# Patient Record
Sex: MALE | Race: WHITE | NOT HISPANIC OR LATINO | Employment: FULL TIME | URBAN - METROPOLITAN AREA
[De-identification: names, ages, dates, MRNs, and addresses within clinical notes are randomized per-mention and may not be internally consistent; named-entity substitution may affect disease eponyms.]

---

## 2017-01-04 ENCOUNTER — OFFICE VISIT (OUTPATIENT)
Dept: FAMILY MEDICINE CLINIC | Facility: CLINIC | Age: 47
End: 2017-01-04

## 2017-01-04 VITALS
OXYGEN SATURATION: 98 % | HEART RATE: 60 BPM | RESPIRATION RATE: 16 BRPM | WEIGHT: 179 LBS | TEMPERATURE: 97.9 F | SYSTOLIC BLOOD PRESSURE: 110 MMHG | HEIGHT: 71 IN | DIASTOLIC BLOOD PRESSURE: 72 MMHG | BODY MASS INDEX: 25.06 KG/M2

## 2017-01-04 DIAGNOSIS — M25.572 ACUTE LEFT ANKLE PAIN: ICD-10-CM

## 2017-01-04 DIAGNOSIS — R79.89 ELEVATED SERUM CREATININE: Primary | ICD-10-CM

## 2017-01-04 LAB
BILIRUB BLD-MCNC: NEGATIVE MG/DL
BUN SERPL-MCNC: 16 MG/DL (ref 6–20)
BUN/CREAT SERPL: 12.7 (ref 7–25)
CALCIUM SERPL-MCNC: 9.6 MG/DL (ref 8.6–10.5)
CHLORIDE SERPL-SCNC: 103 MMOL/L (ref 98–107)
CLARITY, POC: CLEAR
CO2 SERPL-SCNC: 27.5 MMOL/L (ref 22–29)
COLOR UR: YELLOW
CREAT SERPL-MCNC: 1.26 MG/DL (ref 0.76–1.27)
GLUCOSE SERPL-MCNC: 78 MG/DL (ref 65–99)
GLUCOSE UR STRIP-MCNC: NEGATIVE MG/DL
KETONES UR QL: NEGATIVE
LEUKOCYTE EST, POC: NEGATIVE
NITRITE UR-MCNC: NEGATIVE MG/ML
PH UR: 7 [PH] (ref 5–8)
POTASSIUM SERPL-SCNC: 3.9 MMOL/L (ref 3.5–5.2)
PROT UR STRIP-MCNC: NEGATIVE MG/DL
RBC # UR STRIP: NEGATIVE /UL
SODIUM SERPL-SCNC: 141 MMOL/L (ref 136–145)
SP GR UR: 1.02 (ref 1–1.03)
UROBILINOGEN UR QL: NORMAL

## 2017-01-04 PROCEDURE — 81003 URINALYSIS AUTO W/O SCOPE: CPT | Performed by: FAMILY MEDICINE

## 2017-01-04 PROCEDURE — 99214 OFFICE O/P EST MOD 30 MIN: CPT | Performed by: FAMILY MEDICINE

## 2017-01-04 NOTE — PROGRESS NOTES
Subjective   Landon Howell is a 46 y.o. male.     Chief Complaint   Patient presents with   • Follow-up     Pt here for f/u labwork       HPI     Pt here to follow up on an elevated creatinine level on routine blood work done at the beginning of December. No family hx of kidney disease. No blood in his urine. No hx of renal stones. He states that he thinks he drinks an adequate amount of water. Denies flank pain or abdominal pain. BP well controlled. No lower extremity swelling.     Patient also reports some pain in his left ankle for little over a week.  He states that it only happens when he is running on the treadmill.  Pain is sharp and located in the ankle joint.  He denies any swelling or bruising.  No numbness or tingling in the toes or foot.  No history of injury.  He states that the pain resolves once he stops running.  It happens and no other areas.  He does not have pain when he does the elliptical.  He does not have any pain with walking.    The following portions of the patient's history were reviewed and updated as appropriate: allergies, current medications, past family history, past medical history, past social history, past surgical history and problem list.    Review of Systems   All other systems reviewed and are negative.      Objective  Vitals:    01/04/17 0803   BP: 110/72   Pulse: 60   Resp: 16   Temp: 97.9 °F (36.6 °C)   SpO2: 98%        Physical Exam   Constitutional: He is oriented to person, place, and time. He appears well-developed and well-nourished. No distress.   HENT:   Head: Normocephalic and atraumatic.   Right Ear: External ear normal.   Left Ear: External ear normal.   Nose: Nose normal.   Mouth/Throat: Oropharynx is clear and moist.   Eyes: Conjunctivae and EOM are normal. Pupils are equal, round, and reactive to light. Right eye exhibits no discharge. Left eye exhibits no discharge. No scleral icterus.   Cardiovascular: Normal rate, regular rhythm and normal heart sounds.     Pulmonary/Chest: Effort normal and breath sounds normal. No respiratory distress. He has no wheezes. He has no rales.   Abdominal: Soft. Bowel sounds are normal. He exhibits no distension. There is no tenderness.   Musculoskeletal:        Left ankle: He exhibits normal range of motion, no swelling, no ecchymosis, no deformity, no laceration and normal pulse. No tenderness. No lateral malleolus, no medial malleolus, no AITFL, no CF ligament, no posterior TFL, no head of 5th metatarsal and no proximal fibula tenderness found. Achilles tendon exhibits no pain.   Neurological: He is alert and oriented to person, place, and time.   Skin: Skin is warm and dry. He is not diaphoretic.   Nursing note and vitals reviewed.        Current Outpatient Prescriptions:   •  fenofibrate 160 MG tablet, Take 1 tablet by mouth daily., Disp: 90 tablet, Rfl: 1  •  omeprazole (PriLOSEC) 40 MG capsule, Take 1 capsule by mouth Daily., Disp: 90 capsule, Rfl: 1  •  ranitidine (ZANTAC) 150 MG capsule, , Disp: , Rfl:     Procedures    Lab Results (most recent)     None          Assessment/Plan   Landon was seen today for follow-up.    Diagnoses and all orders for this visit:    Elevated serum creatinine  -     Basic Metabolic Panel  -     POC Urinalysis Dipstick, Automated    Acute left ankle pain      We will get a basic metabolic panel to reevaluate kidney function.  UA done in the office was negative. If levels continue to be elevated will consider imaging and possible referral to nephrology.    Physical exam reassuring for left ankle pain.  No radiographs necessary at this time  Advised anti-inflammatory medication and rest.  If continues to have issues or problems gets worse will consider physical therapy.     Return in about 3 months (around 4/4/2017) for Recheck.      Kip Torres MD

## 2017-01-04 NOTE — MR AVS SNAPSHOT
Landon Howell   1/4/2017 8:00 AM   Office Visit    Dept Phone:  [phone]   Encounter #:  [id number]    Provider:  Kip Torres MD   Department:  [facility]                Your Full Care Plan              Today's Medication Changes          These changes are accurate as of: 1/4/17  8:35 AM.  If you have any questions, ask your nurse or doctor.               Stop taking medication(s)listed here:     Cetirizine HCl 10 MG capsule   Stopped by:  Kip Torres MD                      Your Updated Medication List          This list is accurate as of: 1/4/17  8:35 AM.  Always use your most recent med list.                fenofibrate 160 MG tablet   Take 1 tablet by mouth daily.       omeprazole 40 MG capsule   Commonly known as:  priLOSEC   Take 1 capsule by mouth Daily.       ranitidine 150 MG capsule   Commonly known as:  ZANTAC               We Performed the Following     Basic Metabolic Panel     POC Urinalysis Dipstick, Automated       You Were Diagnosed With        Codes Comments    Elevated serum creatinine    -  Primary ICD-10-CM: R79.89  ICD-9-CM: 790.99     Acute left ankle pain     ICD-10-CM: M25.572  ICD-9-CM: 719.47       Instructions     None    Patient Instructions History      Upcoming Appointments     Visit Type Date Time Department    OFFICE VISIT 1/4/2017  8:00 AM DONALD BARRIENTOS      [employer] Signup     Our records indicate that you have an active [employer] account.    You can view your After Visit Summary by going to [employer] and logging in with your [employer] username and password.  If you don't have a [employer] username and password but a parent or guardian has access to your record, the parent or guardian should login with their own [employer] username and password and access your record to view the After Visit Summary.    If you have questions, you can email [email or web address] or call  "[phone] to talk to our MyChart staff.  Remember, MyChart is NOT to be used for urgent needs.  For medical emergencies, dial 911.               Other Info from Your Visit           Allergies     No Known Allergies      Reason for Visit     Follow-up Pt here for f/u labwork      Vital Signs     Blood Pressure Pulse Temperature Respirations Height Weight    110/72 60 97.9 °F (36.6 °C) (Oral) 16 71\" (180.3 cm) 179 lb (81.2 kg)    Oxygen Saturation Body Mass Index Smoking Status             98% 24.97 kg/m2 Never Smoker         Problems and Diagnoses Noted     Serum creatinine raised    -  Primary    Acute left ankle pain            "

## 2017-01-05 ENCOUNTER — TELEPHONE (OUTPATIENT)
Dept: FAMILY MEDICINE CLINIC | Facility: CLINIC | Age: 47
End: 2017-01-05

## 2017-01-05 RX ORDER — ZOLPIDEM TARTRATE 10 MG/1
10 TABLET ORAL NIGHTLY PRN
Qty: 10 TABLET | Refills: 0 | OUTPATIENT
Start: 2017-01-05 | End: 2019-06-26

## 2017-01-05 NOTE — TELEPHONE ENCOUNTER
Pt called requesting Rx for Ambien. Stated he will be traveling and uses it for jet lag. Only wants 10 pills.

## 2017-02-28 DIAGNOSIS — E78.5 HYPERLIPIDEMIA, UNSPECIFIED HYPERLIPIDEMIA TYPE: Primary | ICD-10-CM

## 2017-02-28 RX ORDER — FENOFIBRATE 160 MG/1
160 TABLET ORAL DAILY
Qty: 90 TABLET | Refills: 1 | Status: SHIPPED | OUTPATIENT
Start: 2017-02-28 | End: 2017-09-28 | Stop reason: SDUPTHER

## 2017-03-22 RX ORDER — OMEPRAZOLE 40 MG/1
40 CAPSULE, DELAYED RELEASE ORAL DAILY
Qty: 90 CAPSULE | Refills: 1 | Status: SHIPPED | OUTPATIENT
Start: 2017-03-22 | End: 2017-11-29 | Stop reason: SDUPTHER

## 2017-07-31 ENCOUNTER — OFFICE VISIT (OUTPATIENT)
Dept: FAMILY MEDICINE CLINIC | Facility: CLINIC | Age: 47
End: 2017-07-31

## 2017-07-31 VITALS
RESPIRATION RATE: 16 BRPM | DIASTOLIC BLOOD PRESSURE: 68 MMHG | HEART RATE: 61 BPM | TEMPERATURE: 98.6 F | HEIGHT: 71 IN | BODY MASS INDEX: 25.06 KG/M2 | WEIGHT: 179 LBS | OXYGEN SATURATION: 98 % | SYSTOLIC BLOOD PRESSURE: 128 MMHG

## 2017-07-31 DIAGNOSIS — K21.9 GASTROESOPHAGEAL REFLUX DISEASE WITHOUT ESOPHAGITIS: ICD-10-CM

## 2017-07-31 DIAGNOSIS — L20.89 OTHER ATOPIC DERMATITIS: Primary | ICD-10-CM

## 2017-07-31 DIAGNOSIS — M54.6 ACUTE RIGHT-SIDED THORACIC BACK PAIN: ICD-10-CM

## 2017-07-31 PROBLEM — L20.9 ATOPIC DERMATITIS: Status: ACTIVE | Noted: 2017-07-31

## 2017-07-31 PROCEDURE — 99214 OFFICE O/P EST MOD 30 MIN: CPT | Performed by: FAMILY MEDICINE

## 2017-07-31 NOTE — PROGRESS NOTES
Subjective   Landon Howell is a 47 y.o. male.     Chief Complaint   Patient presents with   • Back Pain     Patient has lower back pain comesnd goes for 2 weeks.    • Earache     Patient has a itchness in both ears. Heused to have Hydocort it did help.    • Heartburn     Patient is not using Prilosecc he is nottaking it anymore but he still has heartburn feelings.        HPI     Patient is here to discuss a couple of issues that are new to me and an issue that is old to me.  Patient has a history of pretty extensive gastroesophageal reflux disease.  Previously he was using omeprazole 40 mg twice a day.  Since our last visit he has change that to omeprazole 40 mg in the morning and Zantac 150 mg at night.  Since then he was doing some research about PPI therapy and became concerned about the risks associated with that medication.  He has since stopped taking medication and is only using Zantac accompanied with Tums throughout the day.  Symptom control has not been great.  He continues to get some reflux about 2-3 times a day.  No nausea or vomiting or blood in his stool.    Patient has history of atopic dermatitis in his bilateral ears.  He's used some steroid with antibiotic drops in the past with good results.    Patient is acute onset of some right-sided thoracic back pain.  He is unsure if it was due to any increase in activity.  He denies any injury.  Pain has responded well to NSAIDs.  Penis can of a dull ache but can become sharp with movement.    The following portions of the patient's history were reviewed and updated as appropriate: allergies, current medications, past family history, past medical history, past social history, past surgical history and problem list.    Review of Systems   HENT: Positive for ear pain.    Musculoskeletal: Positive for back pain.   All other systems reviewed and are negative.      Objective  Vitals:    07/31/17 1510   BP: 128/68   Pulse: 61   Resp: 16   Temp: 98.6 °F (37  °C)   SpO2: 98%        Physical Exam   Constitutional: He is oriented to person, place, and time. He appears well-developed and well-nourished. No distress.   HENT:   Head: Normocephalic and atraumatic.   Right Ear: External ear normal.   Left Ear: External ear normal.   Nose: Nose normal.   Mouth/Throat: Oropharynx is clear and moist.   Eyes: Conjunctivae and EOM are normal. Pupils are equal, round, and reactive to light. Right eye exhibits no discharge. Left eye exhibits no discharge. No scleral icterus.   Cardiovascular: Normal rate, regular rhythm and normal heart sounds.    Pulmonary/Chest: Effort normal and breath sounds normal. No respiratory distress. He has no wheezes. He has no rales.   Abdominal: Soft. Bowel sounds are normal. He exhibits no distension. There is no tenderness.   Musculoskeletal:        Thoracic back: He exhibits decreased range of motion, tenderness and pain. He exhibits no bony tenderness, no swelling, no edema, no deformity, no laceration, no spasm and normal pulse.        Back:    Neurological: He is alert and oriented to person, place, and time.   Skin: Skin is warm and dry. He is not diaphoretic.   Nursing note and vitals reviewed.        Current Outpatient Prescriptions:   •  ranitidine (ZANTAC) 150 MG capsule, , Disp: , Rfl:   •  fenofibrate 160 MG tablet, Take 1 tablet by mouth Daily., Disp: 90 tablet, Rfl: 1  •  omeprazole (priLOSEC) 40 MG capsule, Take 1 capsule by mouth Daily., Disp: 90 capsule, Rfl: 1  •  zolpidem (AMBIEN) 10 MG tablet, Take 1 tablet by mouth At Night As Needed for sleep., Disp: 10 tablet, Rfl: 0    Procedures    Lab Results (most recent)     None          Assessment/Plan   Landon was seen today for back pain, earache and heartburn.    Diagnoses and all orders for this visit:    Other atopic dermatitis    Gastroesophageal reflux disease without esophagitis    Acute right-sided thoracic back pain      After extensive conversation regarding the history of  reflux and his current treatment plan advised him that it would probably be okay to continue the omeprazole in the morning and combine that with the Zantac in the evening.  This gave him good symptom control.  We discussed the potential adverse side effects of using omeprazole.  Patient will consider this and let us know what he decides to do.    Some samples of you Eucrisa were given to the patient to trial.  If he likes this treatment option a prescription will be placed.    .  Conservative management for the patient's thoracic back pain.  Advised him on continued rest and use of NSAIDs.    Return for As needed.      Kip Torres MD

## 2017-09-28 DIAGNOSIS — E78.5 HYPERLIPIDEMIA, UNSPECIFIED HYPERLIPIDEMIA TYPE: ICD-10-CM

## 2017-09-28 RX ORDER — FENOFIBRATE 160 MG/1
160 TABLET ORAL DAILY
Qty: 90 TABLET | Refills: 3 | Status: SHIPPED | OUTPATIENT
Start: 2017-09-28 | End: 2018-10-16 | Stop reason: SDUPTHER

## 2017-11-06 ENCOUNTER — OFFICE VISIT (OUTPATIENT)
Dept: FAMILY MEDICINE CLINIC | Facility: CLINIC | Age: 47
End: 2017-11-06

## 2017-11-06 VITALS
SYSTOLIC BLOOD PRESSURE: 122 MMHG | OXYGEN SATURATION: 99 % | RESPIRATION RATE: 14 BRPM | DIASTOLIC BLOOD PRESSURE: 70 MMHG | HEART RATE: 66 BPM | TEMPERATURE: 98.3 F | HEIGHT: 71 IN | WEIGHT: 183 LBS | BODY MASS INDEX: 25.62 KG/M2

## 2017-11-06 DIAGNOSIS — M25.552 PAIN OF LEFT HIP JOINT: Primary | ICD-10-CM

## 2017-11-06 PROCEDURE — 99214 OFFICE O/P EST MOD 30 MIN: CPT | Performed by: FAMILY MEDICINE

## 2017-11-06 PROCEDURE — 73502 X-RAY EXAM HIP UNI 2-3 VIEWS: CPT | Performed by: FAMILY MEDICINE

## 2017-11-06 RX ORDER — CETIRIZINE HYDROCHLORIDE 10 MG/1
10 TABLET ORAL DAILY
COMMUNITY

## 2017-11-06 RX ORDER — MELOXICAM 15 MG/1
15 TABLET ORAL DAILY
Qty: 30 TABLET | Refills: 1 | Status: SHIPPED | OUTPATIENT
Start: 2017-11-06 | End: 2017-12-06

## 2017-11-06 NOTE — PROGRESS NOTES
Subjective   Landon Howell is a 47 y.o. male.     Chief Complaint   Patient presents with   • Hip Pain     Patient has left hip pain. He injured it 25 years ago.        HPI     Patient presented with left hip pain.  Patient states that he has been experiencing some intermittent dull left hip pain.  Does not radiate.  Patient states that the pain is located on the lateral aspect of the hip.  Is unaware of any exacerbating factors.  No numbness or tingling.  No known injury.  Patient states that 25 years ago he was on a hike and had some similar left hip pain that is kind of plagued him on and off over the years.  Does try some Aleve which takes the edge off.    The following portions of the patient's history were reviewed and updated as appropriate: allergies, current medications, past family history, past medical history, past social history, past surgical history and problem list.    Review of Systems   Musculoskeletal:        Left hip pain   All other systems reviewed and are negative.      Objective  Vitals:    11/06/17 0820   BP: 122/70   Pulse: 66   Resp: 14   Temp: 98.3 °F (36.8 °C)   SpO2: 99%        Physical Exam   Constitutional: He is oriented to person, place, and time. He appears well-developed and well-nourished. No distress.   Musculoskeletal:        Left hip: He exhibits tenderness and bony tenderness. He exhibits normal range of motion, normal strength, no swelling, no crepitus, no deformity and no laceration.        Legs:  Neurological: He is alert and oriented to person, place, and time.   Skin: He is not diaphoretic.   Psychiatric: He has a normal mood and affect. His behavior is normal.   Nursing note and vitals reviewed.        Current Outpatient Prescriptions:   •  cetirizine (zyrTEC) 10 MG tablet, Take 10 mg by mouth Daily., Disp: , Rfl:   •  fenofibrate 160 MG tablet, Take 1 tablet by mouth Daily., Disp: 90 tablet, Rfl: 3  •  omeprazole (priLOSEC) 40 MG capsule, Take 1 capsule by mouth  Daily., Disp: 90 capsule, Rfl: 1  •  ranitidine (ZANTAC) 150 MG capsule, , Disp: , Rfl:   •  meloxicam (MOBIC) 15 MG tablet, Take 1 tablet by mouth Daily for 30 days. 1 tab PO QD for pain, Disp: 30 tablet, Rfl: 1  •  zolpidem (AMBIEN) 10 MG tablet, Take 1 tablet by mouth At Night As Needed for sleep., Disp: 10 tablet, Rfl: 0    Procedures    Lab Results (most recent)     None          Assessment/Plan   Landon was seen today for hip pain.    Diagnoses and all orders for this visit:    Pain of left hip joint  -     meloxicam (MOBIC) 15 MG tablet; Take 1 tablet by mouth Daily for 30 days. 1 tab PO QD for pain  -     XR Hip With or Without Pelvis 2 - 3 View Left    2 view x-ray done of the left hip in the office for pain.  No previous images were available for comparison.  No acute osseous abnormality noted.  Good joint spacing.  No osteophytes or arthritis noted.    Discuss treatment options.  We will go with conservative management at this time with meloxicam.  Advised patient to take 1 tablet daily for 2 weeks.  If no improvement will consider physical therapy.      Return in about 2 weeks (around 11/20/2017) for Recheck.      Kip Torres MD

## 2017-11-29 ENCOUNTER — TELEPHONE (OUTPATIENT)
Dept: FAMILY MEDICINE CLINIC | Facility: CLINIC | Age: 47
End: 2017-11-29

## 2017-11-29 RX ORDER — OMEPRAZOLE 40 MG/1
40 CAPSULE, DELAYED RELEASE ORAL DAILY
Qty: 90 CAPSULE | Refills: 3 | Status: SHIPPED | OUTPATIENT
Start: 2017-11-29 | End: 2019-06-26

## 2018-01-12 ENCOUNTER — OFFICE VISIT (OUTPATIENT)
Dept: FAMILY MEDICINE CLINIC | Facility: CLINIC | Age: 48
End: 2018-01-12

## 2018-01-12 VITALS
WEIGHT: 183.2 LBS | HEIGHT: 71 IN | RESPIRATION RATE: 14 BRPM | HEART RATE: 69 BPM | TEMPERATURE: 98.3 F | DIASTOLIC BLOOD PRESSURE: 60 MMHG | OXYGEN SATURATION: 99 % | SYSTOLIC BLOOD PRESSURE: 122 MMHG | BODY MASS INDEX: 25.65 KG/M2

## 2018-01-12 DIAGNOSIS — R10.13 EPIGASTRIC PAIN: Primary | ICD-10-CM

## 2018-01-12 PROCEDURE — 99213 OFFICE O/P EST LOW 20 MIN: CPT | Performed by: FAMILY MEDICINE

## 2018-01-12 RX ORDER — TRIAMCINOLONE ACETONIDE 1 MG/G
CREAM TOPICAL
Refills: 1 | COMMUNITY
Start: 2017-12-08 | End: 2019-06-26

## 2018-01-12 NOTE — PROGRESS NOTES
Subjective   Landon Howell is a 47 y.o. male.     Chief Complaint   Patient presents with   • Back Pain     Patient has a upper back pain for 3 weeks.        HPI     Patient presents to the office complaining of intermittent thoracic back pain that radiates to his epigastric region on the right side.  No fever or chills.  Does not believe any certain foods are the trigger.  Has history of reflux.  Pain is sharp and stabbing.    The following portions of the patient's history were reviewed and updated as appropriate: allergies, current medications, past family history, past medical history, past social history, past surgical history and problem list.    Review of Systems   Musculoskeletal: Positive for back pain.   All other systems reviewed and are negative.      Objective  Vitals:    01/12/18 0817   BP: 122/60   Pulse: 69   Resp: 14   Temp: 98.3 °F (36.8 °C)   SpO2: 99%        Physical Exam   Constitutional: He is oriented to person, place, and time. He appears well-developed and well-nourished. No distress.   HENT:   Head: Normocephalic and atraumatic.   Right Ear: External ear normal.   Left Ear: External ear normal.   Nose: Nose normal.   Mouth/Throat: Oropharynx is clear and moist.   Eyes: Conjunctivae and EOM are normal. Pupils are equal, round, and reactive to light. Right eye exhibits no discharge. Left eye exhibits no discharge. No scleral icterus.   Cardiovascular: Normal rate, regular rhythm and normal heart sounds.    Pulmonary/Chest: Effort normal and breath sounds normal. No respiratory distress. He has no wheezes. He has no rales.   Abdominal: Soft. Bowel sounds are normal. He exhibits no distension and no mass. There is tenderness (epigastric and right upper quadrant). There is no rebound and no guarding. No hernia.   Neurological: He is alert and oriented to person, place, and time.   Skin: Skin is warm and dry. He is not diaphoretic.   Nursing note and vitals reviewed.        Current Outpatient  Prescriptions:   •  cetirizine (zyrTEC) 10 MG tablet, Take 10 mg by mouth Daily., Disp: , Rfl:   •  fenofibrate 160 MG tablet, Take 1 tablet by mouth Daily., Disp: 90 tablet, Rfl: 3  •  omeprazole (priLOSEC) 40 MG capsule, Take 1 capsule by mouth Daily., Disp: 90 capsule, Rfl: 3  •  ranitidine (ZANTAC) 150 MG capsule, , Disp: , Rfl:   •  triamcinolone (KENALOG) 0.1 % cream, , Disp: , Rfl: 1  •  zolpidem (AMBIEN) 10 MG tablet, Take 1 tablet by mouth At Night As Needed for sleep., Disp: 10 tablet, Rfl: 0    Procedures    Lab Results (most recent)     None          Assessment/Plan   Landon was seen today for back pain.    Diagnoses and all orders for this visit:    Epigastric pain  -     US Liver; Future    Differential diagnosis includes gallbladder pathology, gastric ulcer, cholelithiasis, cholecystitis.  We'll get ultrasound to evaluate.    Return in about 2 weeks (around 1/26/2018) for Recheck.      Kip Torres MD

## 2018-01-22 ENCOUNTER — HOSPITAL ENCOUNTER (OUTPATIENT)
Dept: ULTRASOUND IMAGING | Facility: HOSPITAL | Age: 48
Discharge: HOME | End: 2018-01-22
Admitting: FAMILY MEDICINE

## 2018-01-22 ENCOUNTER — TELEPHONE (OUTPATIENT)
Dept: FAMILY MEDICINE CLINIC | Facility: CLINIC | Age: 48
End: 2018-01-22

## 2018-01-22 DIAGNOSIS — K86.89 PANCREATIC MASS: Primary | ICD-10-CM

## 2018-01-22 DIAGNOSIS — R10.13 EPIGASTRIC PAIN: ICD-10-CM

## 2018-01-22 PROCEDURE — 76705 ECHO EXAM OF ABDOMEN: CPT

## 2018-01-23 ENCOUNTER — TELEPHONE (OUTPATIENT)
Dept: FAMILY MEDICINE CLINIC | Facility: CLINIC | Age: 48
End: 2018-01-23

## 2018-01-23 ENCOUNTER — HOSPITAL ENCOUNTER (OUTPATIENT)
Dept: CT IMAGING | Facility: HOSPITAL | Age: 48
Discharge: HOME | End: 2018-01-23
Admitting: FAMILY MEDICINE

## 2018-01-23 DIAGNOSIS — K86.89 PANCREATIC MASS: ICD-10-CM

## 2018-01-23 PROCEDURE — 0 IOPAMIDOL 61 % SOLUTION: Performed by: FAMILY MEDICINE

## 2018-01-23 PROCEDURE — 74170 CT ABD WO CNTRST FLWD CNTRST: CPT

## 2018-01-23 PROCEDURE — 0 DIATRIZOATE MEGLUMINE & SODIUM PER 1 ML: Performed by: FAMILY MEDICINE

## 2018-01-23 RX ADMIN — IOPAMIDOL 85 ML: 612 INJECTION, SOLUTION INTRAVENOUS at 10:31

## 2018-01-23 RX ADMIN — DIATRIZOATE MEGLUMINE AND DIATRIZOATE SODIUM 30 ML: 660; 100 LIQUID ORAL; RECTAL at 10:31

## 2018-01-23 NOTE — TELEPHONE ENCOUNTER
[facility] in [place], states that the CT order for this patient needs to be changed. Order needs to state: CT Abdomen with Contrast. If you have any questions, back line number is [phone]

## 2018-01-24 ENCOUNTER — TELEPHONE (OUTPATIENT)
Dept: FAMILY MEDICINE CLINIC | Facility: CLINIC | Age: 48
End: 2018-01-24

## 2018-01-24 DIAGNOSIS — K86.89 PANCREATIC MASS: Primary | ICD-10-CM

## 2018-01-24 NOTE — TELEPHONE ENCOUNTER
Patient called office multiple times for results of CT Abdomen, results are normal.  He is aware that the results may not be available on BackTrack until tomorrow.

## 2018-01-25 NOTE — TELEPHONE ENCOUNTER
I spoke with the patient is morning regarding his CT abdomen results.  Originally the patient was told that the CT scan was negative.  An addendum was placed on the official read by the radiologist that did show a small possible pancreatic mass and recommended MRI of the abdomen with and without contrast.  I explained what had happened to the patient.  He stated understanding.  He was understandably somewhat upset.  I advised him that I will place the orders for the MRI.  Patient is going out of town until February 5.  I expressed to him my regrets for the situation and for his overall well-being.

## 2018-02-06 ENCOUNTER — OFFICE VISIT (OUTPATIENT)
Dept: FAMILY MEDICINE CLINIC | Facility: CLINIC | Age: 48
End: 2018-02-06

## 2018-02-06 VITALS
TEMPERATURE: 100.1 F | HEIGHT: 71 IN | DIASTOLIC BLOOD PRESSURE: 78 MMHG | RESPIRATION RATE: 12 BRPM | SYSTOLIC BLOOD PRESSURE: 124 MMHG | WEIGHT: 179 LBS | HEART RATE: 78 BPM | OXYGEN SATURATION: 99 % | BODY MASS INDEX: 25.06 KG/M2

## 2018-02-06 DIAGNOSIS — J11.1 INFLUENZA: ICD-10-CM

## 2018-02-06 DIAGNOSIS — R50.9 FEVER, UNSPECIFIED FEVER CAUSE: Primary | ICD-10-CM

## 2018-02-06 LAB
EXPIRATION DATE: NORMAL
FLUAV AG NPH QL: NORMAL
FLUBV AG NPH QL: NORMAL
INTERNAL CONTROL: NORMAL
Lab: NORMAL

## 2018-02-06 PROCEDURE — 99213 OFFICE O/P EST LOW 20 MIN: CPT | Performed by: FAMILY MEDICINE

## 2018-02-06 PROCEDURE — 87804 INFLUENZA ASSAY W/OPTIC: CPT | Performed by: FAMILY MEDICINE

## 2018-02-06 RX ORDER — ONDANSETRON 4 MG/1
4 TABLET, FILM COATED ORAL EVERY 8 HOURS PRN
Qty: 20 TABLET | Refills: 0 | Status: SHIPPED | OUTPATIENT
Start: 2018-02-06 | End: 2019-06-26

## 2018-02-06 RX ORDER — OSELTAMIVIR PHOSPHATE 75 MG/1
75 CAPSULE ORAL 2 TIMES DAILY
Qty: 10 CAPSULE | Refills: 0 | Status: SHIPPED | OUTPATIENT
Start: 2018-02-06 | End: 2018-02-11

## 2018-02-06 NOTE — PROGRESS NOTES
Subjective   Landon Howell is a 47 y.o. male.     Chief Complaint   Patient presents with   • Chills     Patient has chills, headaches, and fatigue started today.        HPI     Patient presents with 24 hours of fever or chills, body aches, and congestion.    The following portions of the patient's history were reviewed and updated as appropriate: allergies, current medications, past family history, past medical history, past social history, past surgical history and problem list.    Review of Systems   Constitutional: Positive for chills, fatigue and fever.   All other systems reviewed and are negative.      Objective  Vitals:    02/06/18 1143   BP: 124/78   Pulse: 78   Resp: 12   Temp: 100.1 °F (37.8 °C)   SpO2: 99%        Physical Exam   Constitutional: He is oriented to person, place, and time. He appears well-developed and well-nourished. No distress.   HENT:   Head: Normocephalic and atraumatic.   Right Ear: Tympanic membrane, external ear and ear canal normal.   Left Ear: Tympanic membrane, external ear and ear canal normal.   Nose: Mucosal edema and rhinorrhea present. Right sinus exhibits no maxillary sinus tenderness and no frontal sinus tenderness. Left sinus exhibits no maxillary sinus tenderness and no frontal sinus tenderness.   Mouth/Throat: Uvula is midline. Posterior oropharyngeal erythema present. No oropharyngeal exudate, posterior oropharyngeal edema or tonsillar abscesses. No tonsillar exudate.   Eyes: Conjunctivae, EOM and lids are normal. Pupils are equal, round, and reactive to light.   Cardiovascular: Normal rate, regular rhythm and normal heart sounds.  Exam reveals no friction rub.    No murmur heard.  Pulmonary/Chest: Effort normal and breath sounds normal. No respiratory distress. He has no wheezes. He has no rales.   Abdominal: Soft. Bowel sounds are normal. He exhibits no distension. There is no tenderness. There is no rebound and no guarding.   Neurological: He is alert and  oriented to person, place, and time.   Skin: Skin is warm and dry. He is not diaphoretic.   Nursing note and vitals reviewed.        Current Outpatient Prescriptions:   •  cetirizine (zyrTEC) 10 MG tablet, Take 10 mg by mouth Daily., Disp: , Rfl:   •  fenofibrate 160 MG tablet, Take 1 tablet by mouth Daily., Disp: 90 tablet, Rfl: 3  •  omeprazole (priLOSEC) 40 MG capsule, Take 1 capsule by mouth Daily., Disp: 90 capsule, Rfl: 3  •  ranitidine (ZANTAC) 150 MG capsule, , Disp: , Rfl:   •  ondansetron (ZOFRAN) 4 MG tablet, Take 1 tablet by mouth Every 8 (Eight) Hours As Needed for Nausea or Vomiting., Disp: 20 tablet, Rfl: 0  •  oseltamivir (TAMIFLU) 75 MG capsule, Take 1 capsule by mouth 2 (Two) Times a Day for 5 days. 1 tab PO BID x 5 days for influenza, Disp: 10 capsule, Rfl: 0  •  triamcinolone (KENALOG) 0.1 % cream, , Disp: , Rfl: 1  •  zolpidem (AMBIEN) 10 MG tablet, Take 1 tablet by mouth At Night As Needed for sleep., Disp: 10 tablet, Rfl: 0    Procedures    Lab Results (most recent)     None          Assessment/Plan   Landon was seen today for chills.    Diagnoses and all orders for this visit:    Fever, unspecified fever cause  -     POC Influenza A / B  -     oseltamivir (TAMIFLU) 75 MG capsule; Take 1 capsule by mouth 2 (Two) Times a Day for 5 days. 1 tab PO BID x 5 days for influenza  -     ondansetron (ZOFRAN) 4 MG tablet; Take 1 tablet by mouth Every 8 (Eight) Hours As Needed for Nausea or Vomiting.    Influenza  -     oseltamivir (TAMIFLU) 75 MG capsule; Take 1 capsule by mouth 2 (Two) Times a Day for 5 days. 1 tab PO BID x 5 days for influenza  -     ondansetron (ZOFRAN) 4 MG tablet; Take 1 tablet by mouth Every 8 (Eight) Hours As Needed for Nausea or Vomiting.    Influenza test negative but we'll treat based on symptoms.    Return for As needed.      Kip Torres MD

## 2018-02-07 ENCOUNTER — APPOINTMENT (OUTPATIENT)
Dept: MRI IMAGING | Facility: HOSPITAL | Age: 48
End: 2018-02-07

## 2018-02-08 ENCOUNTER — TELEPHONE (OUTPATIENT)
Dept: FAMILY MEDICINE CLINIC | Facility: CLINIC | Age: 48
End: 2018-02-08

## 2018-02-08 NOTE — TELEPHONE ENCOUNTER
Please contact the patient to see if there is any blood in his stool.  If there is not this is most likely just a progression of the virus.  Please have him increase the amount of fluids that he is drinking and try bowel rest with bland foods like bananas, rice, applesauce, and toast.

## 2018-02-08 NOTE — TELEPHONE ENCOUNTER
Pt called and said he is having abdominal and diarrhea and stated yesterday. Pt is done with tamiflu last dose he took was last night he is not taking it anymore. He needs an advise.

## 2018-02-09 ENCOUNTER — HOSPITAL ENCOUNTER (OUTPATIENT)
Dept: MRI IMAGING | Facility: HOSPITAL | Age: 48
End: 2018-02-09

## 2018-02-12 ENCOUNTER — TELEPHONE (OUTPATIENT)
Dept: FAMILY MEDICINE CLINIC | Facility: CLINIC | Age: 48
End: 2018-02-12

## 2018-02-12 NOTE — TELEPHONE ENCOUNTER
Mr. Howell is returning to work today and needs a note. I just need to know if there are any restrictions and I'll fax the note to his employer.    Iva Russo  Fax: [phone]

## 2018-02-14 ENCOUNTER — HOSPITAL ENCOUNTER (OUTPATIENT)
Dept: MRI IMAGING | Facility: HOSPITAL | Age: 48
Discharge: HOME | End: 2018-02-14
Admitting: FAMILY MEDICINE

## 2018-02-14 DIAGNOSIS — K86.89 PANCREATIC MASS: ICD-10-CM

## 2018-02-14 PROCEDURE — A9577 INJ MULTIHANCE: HCPCS | Performed by: FAMILY MEDICINE

## 2018-02-14 PROCEDURE — 0 GADOBENATE DIMEGLUMINE 529 MG/ML SOLUTION: Performed by: FAMILY MEDICINE

## 2018-02-14 PROCEDURE — 74183 MRI ABD W/O CNTR FLWD CNTR: CPT

## 2018-02-14 RX ADMIN — GADOBENATE DIMEGLUMINE 16 ML: 529 INJECTION, SOLUTION INTRAVENOUS at 17:00

## 2018-02-22 ENCOUNTER — OFFICE VISIT (OUTPATIENT)
Dept: FAMILY MEDICINE CLINIC | Facility: CLINIC | Age: 48
End: 2018-02-22

## 2018-02-22 VITALS
DIASTOLIC BLOOD PRESSURE: 66 MMHG | SYSTOLIC BLOOD PRESSURE: 122 MMHG | WEIGHT: 177 LBS | TEMPERATURE: 98.6 F | HEIGHT: 71 IN | OXYGEN SATURATION: 97 % | BODY MASS INDEX: 24.78 KG/M2 | HEART RATE: 77 BPM | RESPIRATION RATE: 14 BRPM

## 2018-02-22 DIAGNOSIS — K86.89 PANCREATIC MASS: Primary | ICD-10-CM

## 2018-02-22 PROCEDURE — 99213 OFFICE O/P EST LOW 20 MIN: CPT | Performed by: FAMILY MEDICINE

## 2018-02-22 NOTE — PROGRESS NOTES
Subjective   Landon Howell is a 47 y.o. male.     Chief Complaint   Patient presents with   • Follow-up     Patient is following up on MRI results.    • Foot Swelling     Patient is having left foot pain and swelling possible of gout.        HPI     Patient presents to the office today to follow-up on MRI as well as to discuss a new issue.  She states that for the last 4 days he's had some pain and swelling in his left great toe.  He's never had this issue before.  He took some ibuprofen and the symptoms went away.  No history of gout.  He does have a family history of gout.  He eats protein rich diet.    Patient continues to have some mild epigastric abdominal irritation and tenderness.  We reviewed the MRI results and patient would like to be referred to Dr. Richardson of [facility].    The following portions of the patient's history were reviewed and updated as appropriate: allergies, current medications, past family history, past medical history, past social history, past surgical history and problem list.    Review of Systems   Musculoskeletal: Positive for joint swelling.   All other systems reviewed and are negative.      Objective  Vitals:    02/22/18 0813   BP: 122/66   Pulse: 77   Resp: 14   Temp: 98.6 °F (37 °C)   SpO2: 97%        Physical Exam   Constitutional: He is oriented to person, place, and time. He appears well-developed and well-nourished. No distress.   HENT:   Head: Normocephalic and atraumatic.   Right Ear: External ear normal.   Left Ear: External ear normal.   Nose: Nose normal.   Mouth/Throat: Oropharynx is clear and moist.   Eyes: Conjunctivae and EOM are normal. Pupils are equal, round, and reactive to light. Right eye exhibits no discharge. Left eye exhibits no discharge. No scleral icterus.   Cardiovascular: Normal rate, regular rhythm and normal heart sounds.    Pulmonary/Chest: Effort normal and breath sounds normal. No respiratory distress. He has no wheezes. He has  no rales.   Abdominal: Soft. Bowel sounds are normal. He exhibits no distension. There is no tenderness.   Musculoskeletal:        Left foot: Normal.   Neurological: He is alert and oriented to person, place, and time.   Skin: Skin is warm and dry. He is not diaphoretic.   Nursing note and vitals reviewed.        Current Outpatient Prescriptions:   •  cetirizine (zyrTEC) 10 MG tablet, Take 10 mg by mouth Daily., Disp: , Rfl:   •  fenofibrate 160 MG tablet, Take 1 tablet by mouth Daily., Disp: 90 tablet, Rfl: 3  •  omeprazole (priLOSEC) 40 MG capsule, Take 1 capsule by mouth Daily., Disp: 90 capsule, Rfl: 3  •  ondansetron (ZOFRAN) 4 MG tablet, Take 1 tablet by mouth Every 8 (Eight) Hours As Needed for Nausea or Vomiting., Disp: 20 tablet, Rfl: 0  •  ranitidine (ZANTAC) 150 MG capsule, , Disp: , Rfl:   •  zolpidem (AMBIEN) 10 MG tablet, Take 1 tablet by mouth At Night As Needed for sleep., Disp: 10 tablet, Rfl: 0  •  triamcinolone (KENALOG) 0.1 % cream, , Disp: , Rfl: 1    Procedures    Lab Results (most recent)     None          Assessment/Plan   Landon was seen today for follow-up and foot swelling.    Diagnoses and all orders for this visit:    Pancreatic mass  -     Ambulatory Referral to General Surgery    Pain patient's left great toe may have been gout.  It has resolved.  Advised patient to improve diet and if symptoms return to let us know.  We'll refer to Dr. Richardson of [facility] for consultation regarding patient's pancreatic mass.      Return in about 3 months (around 5/22/2018) for Recheck.      Kip Torres MD

## 2018-06-25 ENCOUNTER — TELEPHONE (OUTPATIENT)
Dept: FAMILY MEDICINE CLINIC | Facility: CLINIC | Age: 48
End: 2018-06-25

## 2018-06-25 NOTE — TELEPHONE ENCOUNTER
Pt calling after sending an email to you through epic then receiving a call that you would like to see him in the office to discuss what the pancreas doctor recommended rather than speaking on the phone. Pt is wanting to get in this week, your next available appt is next Thursday and he feels this should not wait. Is this ok to fit in pt this week or will he be okay to wait until next week? Please advise.

## 2018-06-28 ENCOUNTER — OFFICE VISIT (OUTPATIENT)
Dept: FAMILY MEDICINE CLINIC | Facility: CLINIC | Age: 48
End: 2018-06-28

## 2018-06-28 VITALS
WEIGHT: 181 LBS | RESPIRATION RATE: 14 BRPM | DIASTOLIC BLOOD PRESSURE: 84 MMHG | TEMPERATURE: 98.2 F | SYSTOLIC BLOOD PRESSURE: 128 MMHG | BODY MASS INDEX: 25.34 KG/M2 | HEART RATE: 87 BPM | HEIGHT: 71 IN | OXYGEN SATURATION: 98 %

## 2018-06-28 DIAGNOSIS — D49.0 PANCREATIC TUMOR: Primary | ICD-10-CM

## 2018-06-28 DIAGNOSIS — C7A.8 NEUROENDOCRINE CANCER (HCC): ICD-10-CM

## 2018-06-28 DIAGNOSIS — R35.1 NOCTURIA: ICD-10-CM

## 2018-06-28 LAB — PSA SERPL-MCNC: 0.8 NG/ML (ref 0–4)

## 2018-06-28 PROCEDURE — 99214 OFFICE O/P EST MOD 30 MIN: CPT | Performed by: FAMILY MEDICINE

## 2018-06-28 NOTE — PROGRESS NOTES
Landon Howell is a 48 y.o. male.     Chief Complaint   Patient presents with   • Mass     Patient has a mass on his pancreas showed on MRI.        HPI     Patient is here to follow-up on pancreatic mass.  Patient is met with a number of specialties including surgical oncology.  Yesterday patient had a pancreatic endoscopic ultrasound.  He had a biopsy done.  This was done by Dr. Reno.  The results of the biopsy as far as pathology or is concerned have not been returned but the diagnosis is neuroendocrine tumor in the pancreatic head.  The gastroenterologist recommended surgery.  Patient has met with Dr. Richardson previously who discussed a possible Whipple procedure.  Patient is concerned about other neuroendocrine tumors.  He's been experiencing over the last 6 months or so some increased nighttime urinations.  Does have family history of BRCA gene mutations and cancer.  Does have a family history of prostate cancer.  Also has a family history of male breast cancer.  Patient is wondering if a second opinion from a surgical oncologist might be appropriate.    The following portions of the patient's history were reviewed and updated as appropriate: allergies, current medications, past family history, past medical history, past social history, past surgical history and problem list.    Review of Systems   Constitutional: Negative for activity change.   All other systems reviewed and are negative.      Objective  Vitals:    06/28/18 0953   BP: 128/84   Pulse: 87   Resp: 14   Temp: 98.2 °F (36.8 °C)   SpO2: 98%       Physical Exam   Constitutional: He is oriented to person, place, and time. He appears well-developed and well-nourished. No distress.   HENT:   Head: Normocephalic and atraumatic.   Right Ear: External ear normal.   Left Ear: External ear normal.   Nose: Nose normal.   Mouth/Throat: Oropharynx is clear and moist.   Eyes: Conjunctivae and EOM are normal. Pupils are equal, round, and reactive to  light. Right eye exhibits no discharge. Left eye exhibits no discharge. No scleral icterus.   Cardiovascular: Normal rate, regular rhythm and normal heart sounds.    Pulmonary/Chest: Effort normal and breath sounds normal. No respiratory distress. He has no wheezes. He has no rales. He exhibits no mass and no tenderness. Right breast exhibits no inverted nipple, no mass, no nipple discharge, no skin change and no tenderness. Left breast exhibits no inverted nipple, no mass, no nipple discharge, no skin change and no tenderness. Breasts are symmetrical.   Abdominal: Soft. Bowel sounds are normal. He exhibits no distension. There is tenderness (mild epigastric).   Neurological: He is alert and oriented to person, place, and time.   Skin: Skin is warm and dry. He is not diaphoretic.   Nursing note and vitals reviewed.        Current Outpatient Prescriptions:   •  cetirizine (zyrTEC) 10 MG tablet, Take 10 mg by mouth Daily., Disp: , Rfl:   •  fenofibrate 160 MG tablet, Take 1 tablet by mouth Daily., Disp: 90 tablet, Rfl: 3  •  omeprazole (priLOSEC) 40 MG capsule, Take 1 capsule by mouth Daily., Disp: 90 capsule, Rfl: 3  •  ondansetron (ZOFRAN) 4 MG tablet, Take 1 tablet by mouth Every 8 (Eight) Hours As Needed for Nausea or Vomiting., Disp: 20 tablet, Rfl: 0  •  ranitidine (ZANTAC) 150 MG capsule, , Disp: , Rfl:   •  triamcinolone (KENALOG) 0.1 % cream, , Disp: , Rfl: 1  •  zolpidem (AMBIEN) 10 MG tablet, Take 1 tablet by mouth At Night As Needed for sleep., Disp: 10 tablet, Rfl: 0    Procedures    Lab Results (most recent)     None              Landon was seen today for mass.    Diagnoses and all orders for this visit:    Pancreatic tumor  -     Ambulatory Referral to Surgical Oncology    Neuroendocrine cancer  -     PSA DIAGNOSTIC  -     Ambulatory Referral to Surgical Oncology    Nocturia  -     PSA DIAGNOSTIC      Extensive conversation and chart review done with the patient.  Expressed my concern for his current  situation.  I advised follow-up with specialists.  I do agree that a second opinion is necessary.  Referral placed for Dr. Keenan who is a surgical oncologist at the [facility].  We'll get a PSA due to the concerns as above.    Return in about 2 weeks (around 7/12/2018) for Recheck.      Kip Torres MD

## 2018-08-23 ENCOUNTER — OFFICE VISIT (OUTPATIENT)
Dept: FAMILY MEDICINE CLINIC | Facility: CLINIC | Age: 48
End: 2018-08-23

## 2018-08-23 VITALS
WEIGHT: 168 LBS | OXYGEN SATURATION: 98 % | HEIGHT: 71 IN | BODY MASS INDEX: 23.52 KG/M2 | HEART RATE: 68 BPM | TEMPERATURE: 98 F | RESPIRATION RATE: 14 BRPM | SYSTOLIC BLOOD PRESSURE: 110 MMHG | DIASTOLIC BLOOD PRESSURE: 52 MMHG

## 2018-08-23 DIAGNOSIS — R10.9 ABDOMINAL DISCOMFORT: ICD-10-CM

## 2018-08-23 DIAGNOSIS — R53.83 FATIGUE, UNSPECIFIED TYPE: ICD-10-CM

## 2018-08-23 DIAGNOSIS — R06.02 SHORTNESS OF BREATH: ICD-10-CM

## 2018-08-23 DIAGNOSIS — C25.1 CANCER OF PANCREAS, BODY (HCC): Primary | ICD-10-CM

## 2018-08-23 PROBLEM — K85.90 PANCREATITIS: Status: ACTIVE | Noted: 2018-07-19

## 2018-08-23 PROBLEM — K86.89 MASS OF PANCREAS: Status: ACTIVE | Noted: 2018-07-17

## 2018-08-23 PROCEDURE — 99213 OFFICE O/P EST LOW 20 MIN: CPT | Performed by: FAMILY MEDICINE

## 2018-08-23 RX ORDER — PANTOPRAZOLE SODIUM 40 MG/1
40 TABLET, DELAYED RELEASE ORAL DAILY
Qty: 30 TABLET | Refills: 3 | Status: SHIPPED | OUTPATIENT
Start: 2018-08-23 | End: 2018-12-03 | Stop reason: SDUPTHER

## 2018-08-23 RX ORDER — LIDOCAINE AND PRILOCAINE 25; 25 MG/G; MG/G
CREAM TOPICAL
Refills: 2 | COMMUNITY
Start: 2018-08-10 | End: 2018-08-23 | Stop reason: SDUPTHER

## 2018-08-23 RX ORDER — PROMETHAZINE HYDROCHLORIDE 25 MG/1
12.5-25 TABLET ORAL
COMMUNITY
Start: 2018-08-02 | End: 2019-06-26

## 2018-08-23 RX ORDER — LIDOCAINE AND PRILOCAINE 25; 25 MG/G; MG/G
CREAM TOPICAL
COMMUNITY
Start: 2018-08-08 | End: 2019-06-26

## 2018-08-23 NOTE — PROGRESS NOTES
Landon Howell is a 48 y.o. male.     Chief Complaint   Patient presents with   • Shortness of Breath     Patient is having sob for about a month. He is on Chemo theapy now.        HPI     Patient resents the office today to follow-up on a number of issues.  [facility] [place]  He has been going back and forth for treatment.  He also has an oncologist and surgical oncologist here in [place].  The current plan is to do radiation and chemotherapy in the hopes of shrinking the tumor in order to resect the tumor.  Patient has been doing chemotherapy.  He states he does have some issues including nausea, vomiting, hair loss, and fatigue.  He was speaking with the oncology nutritionist who thought switching up his reflux medications may help.    The following portions of the patient's history were reviewed and updated as appropriate: allergies, current medications, past family history, past medical history, past social history, past surgical history and problem list.    Review of Systems   Respiratory: Positive for shortness of breath.    All other systems reviewed and are negative.      Objective  Vitals:    08/23/18 0946   BP: 110/52   Pulse: 68   Resp: 14   Temp: 98 °F (36.7 °C)   SpO2: 98%       Physical Exam   Constitutional: He is oriented to person, place, and time. He appears well-developed and well-nourished. No distress.   HENT:   Head: Normocephalic and atraumatic.   Right Ear: External ear normal.   Left Ear: External ear normal.   Nose: Nose normal.   Mouth/Throat: Oropharynx is clear and moist.   Eyes: Pupils are equal, round, and reactive to light. Conjunctivae and EOM are normal. Right eye exhibits no discharge. Left eye exhibits no discharge. No scleral icterus.   Cardiovascular: Normal rate, regular rhythm and normal heart sounds.    Pulmonary/Chest: Effort normal and breath sounds normal. No respiratory distress. He has no wheezes. He has  no rales.   Abdominal: Soft. Bowel sounds are normal. He exhibits no distension. There is no tenderness.   Neurological: He is alert and oriented to person, place, and time.   Skin: Skin is warm and dry. He is not diaphoretic.   Nursing note and vitals reviewed.        Current Outpatient Prescriptions:   •  fenofibrate 160 MG tablet, Take 1 tablet by mouth Daily., Disp: 90 tablet, Rfl: 3  •  lidocaine-prilocaine (EMLA) 2.5-2.5 % cream, Apply  topically to the appropriate area as directed., Disp: , Rfl:   •  omeprazole (priLOSEC) 40 MG capsule, Take 1 capsule by mouth Daily., Disp: 90 capsule, Rfl: 3  •  ondansetron (ZOFRAN) 4 MG tablet, Take 1 tablet by mouth Every 8 (Eight) Hours As Needed for Nausea or Vomiting., Disp: 20 tablet, Rfl: 0  •  pancrelipase, Lip-Prot-Amyl, (CREON) 26227-70562 units capsule delayed-release particles capsule, Take 2 capsules by mouth with meals and one capsule with large snacks, Disp: , Rfl:   •  promethazine (PHENERGAN) 25 MG tablet, Take 12.5-25 mg by mouth., Disp: , Rfl:   •  cetirizine (zyrTEC) 10 MG tablet, Take 10 mg by mouth Daily., Disp: , Rfl:   •  pantoprazole (PROTONIX) 40 MG EC tablet, Take 1 tablet by mouth Daily., Disp: 30 tablet, Rfl: 3  •  ranitidine (ZANTAC) 150 MG capsule, , Disp: , Rfl:   •  triamcinolone (KENALOG) 0.1 % cream, , Disp: , Rfl: 1  •  zolpidem (AMBIEN) 10 MG tablet, Take 1 tablet by mouth At Night As Needed for sleep., Disp: 10 tablet, Rfl: 0    Procedures    Lab Results (most recent)     None              Landon was seen today for shortness of breath.    Diagnoses and all orders for this visit:    Cancer of pancreas, body (CMS/HCC)    Fatigue, unspecified type    Shortness of breath    Abdominal discomfort    Other orders  -     pantoprazole (PROTONIX) 40 MG EC tablet; Take 1 tablet by mouth Daily.      Extensive conversation had with the patient regarding his current issues.  I reviewed medical records in the chart.  Advised follow-up as currently  scheduled.  We'll switch his omeprazole to pantoprazole in hopes that this helps.  If this does not help we will send for either pulmonology or gastroenterology.    Return for As needed.      Kip Torres MD

## 2018-10-16 DIAGNOSIS — E78.5 HYPERLIPIDEMIA, UNSPECIFIED HYPERLIPIDEMIA TYPE: ICD-10-CM

## 2018-10-17 RX ORDER — FENOFIBRATE 160 MG/1
160 TABLET ORAL DAILY
Qty: 90 TABLET | Refills: 0 | Status: SHIPPED | OUTPATIENT
Start: 2018-10-17 | End: 2019-06-26

## 2018-12-03 RX ORDER — PANTOPRAZOLE SODIUM 40 MG/1
40 TABLET, DELAYED RELEASE ORAL DAILY
Qty: 90 TABLET | Refills: 3 | Status: SHIPPED | OUTPATIENT
Start: 2018-12-03 | End: 2019-08-05 | Stop reason: SDUPTHER

## 2019-03-08 ENCOUNTER — OFFICE VISIT (OUTPATIENT)
Dept: FAMILY MEDICINE CLINIC | Facility: CLINIC | Age: 49
End: 2019-03-08

## 2019-03-08 VITALS
HEIGHT: 71 IN | SYSTOLIC BLOOD PRESSURE: 102 MMHG | TEMPERATURE: 98.2 F | DIASTOLIC BLOOD PRESSURE: 60 MMHG | OXYGEN SATURATION: 100 % | WEIGHT: 142 LBS | BODY MASS INDEX: 19.88 KG/M2 | HEART RATE: 65 BPM

## 2019-03-08 DIAGNOSIS — M79.675 GREAT TOE PAIN, LEFT: Primary | ICD-10-CM

## 2019-03-08 DIAGNOSIS — C25.1 CANCER OF PANCREAS, BODY (HCC): ICD-10-CM

## 2019-03-08 DIAGNOSIS — E55.9 HYPOVITAMINOSIS D: ICD-10-CM

## 2019-03-08 LAB
25(OH)D3+25(OH)D2 SERPL-MCNC: 20.4 NG/ML (ref 30–100)
URATE SERPL-MCNC: 8.9 MG/DL (ref 3.4–7)

## 2019-03-08 PROCEDURE — 99214 OFFICE O/P EST MOD 30 MIN: CPT | Performed by: FAMILY MEDICINE

## 2019-03-08 RX ORDER — OLANZAPINE 2.5 MG/1
2.5 TABLET ORAL DAILY
COMMUNITY
Start: 2019-03-01 | End: 2020-01-09

## 2019-03-08 RX ORDER — COLCHICINE 0.6 MG/1
0.6 CAPSULE ORAL 3 TIMES DAILY PRN
Qty: 30 CAPSULE | Refills: 2 | Status: SHIPPED | OUTPATIENT
Start: 2019-03-08

## 2019-03-08 RX ORDER — LORAZEPAM 1 MG/1
1 TABLET ORAL DAILY
COMMUNITY
Start: 2019-01-11 | End: 2019-06-26

## 2019-03-08 NOTE — PROGRESS NOTES
Landon Howell is a 48 y.o. male.     Chief Complaint   Patient presents with   • Foot Pain     gout flare up in left foot  afte chemo feels better        HPI     Pt is a pleasant 48 y.o. YO male here for Foot pain.  New patient to me and this office.  PMH includes Pancreatic cancer, on chemo.  Pain started on the L foot at the great toe.  It is severe pain, red, swollen, tender to touch, worse last week at the end this week, improved after he started chemo last week.   He is on chemo currently, eating a high protien diet ad having difficulty drinking water.  Family history of gout with father.      The following portions of the patient's history were reviewed and updated as appropriate: allergies, current medications, past family history, past medical history, past social history, past surgical history and problem list.    Review of Systems   Constitutional: Positive for fatigue.   Eyes: Negative.    Respiratory: Negative.    Cardiovascular: Negative for chest pain, palpitations and leg swelling.   Gastrointestinal: Positive for nausea.   Endocrine: Negative.    Musculoskeletal:        Left foot pain   Allergic/Immunologic: Negative.    Neurological: Positive for weakness.   Hematological: Negative.    Psychiatric/Behavioral: Negative.        Objective  Vitals:    03/08/19 0939   BP: 102/60   Pulse: 65   Temp: 98.2 °F (36.8 °C)   SpO2: 100%        Physical Exam   Constitutional: He is oriented to person, place, and time. He appears well-developed and well-nourished. No distress.   HENT:   Head: Normocephalic.   Nose: Nose normal.   Eyes: EOM are normal.   Cardiovascular:   No murmur heard.  Pulmonary/Chest: Effort normal. No respiratory distress.   Musculoskeletal: Normal range of motion.   Swollen and enlarged toe at the DIP on the left foot.   Neurological: He is alert and oriented to person, place, and time.   Skin: Skin is warm and dry. No rash noted.   Psychiatric: He has a normal mood and affect. His  behavior is normal. Judgment and thought content normal.   Nursing note and vitals reviewed.        Current Outpatient Medications:   •  cetirizine (zyrTEC) 10 MG tablet, Take 10 mg by mouth Daily., Disp: , Rfl:   •  granisetron (SANCUSO) 3.1 MG/24HR, Place 1 patch on the skin as directed by provider., Disp: , Rfl:   •  lidocaine-prilocaine (EMLA) 2.5-2.5 % cream, Apply  topically to the appropriate area as directed., Disp: , Rfl:   •  LORazepam (ATIVAN) 1 MG tablet, Take 1 mg by mouth Daily., Disp: , Rfl:   •  OLANZapine (zyPREXA) 2.5 MG tablet, Take 2.5 mg by mouth Daily., Disp: , Rfl:   •  ondansetron (ZOFRAN) 4 MG tablet, Take 1 tablet by mouth Every 8 (Eight) Hours As Needed for Nausea or Vomiting., Disp: 20 tablet, Rfl: 0  •  pancrelipase, Lip-Prot-Amyl, (CREON) 06660-08396 units capsule delayed-release particles capsule, Take 2 capsules by mouth with meals and one capsule with large snacks, Disp: , Rfl:   •  pantoprazole (PROTONIX) 40 MG EC tablet, Take 1 tablet by mouth Daily., Disp: 90 tablet, Rfl: 3  •  promethazine (PHENERGAN) 25 MG tablet, Take 12.5-25 mg by mouth., Disp: , Rfl:   •  colchicine 0.6 MG capsule capsule, Take 1 capsule by mouth 3 (Three) Times a Day As Needed (Toe pain)., Disp: 30 capsule, Rfl: 2  •  fenofibrate 160 MG tablet, TAKE 1 TABLET BY MOUTH DAILY, Disp: 90 tablet, Rfl: 0  •  omeprazole (priLOSEC) 40 MG capsule, Take 1 capsule by mouth Daily., Disp: 90 capsule, Rfl: 3  •  ranitidine (ZANTAC) 150 MG capsule, , Disp: , Rfl:   •  triamcinolone (KENALOG) 0.1 % cream, , Disp: , Rfl: 1  •  zolpidem (AMBIEN) 10 MG tablet, Take 1 tablet by mouth At Night As Needed for sleep., Disp: 10 tablet, Rfl: 0    Procedures    Lab Results (most recent)     None              Landon was seen today for foot pain.    Diagnoses and all orders for this visit:    Great toe pain, left  -     Uric acid  -     colchicine 0.6 MG capsule capsule; Take 1 capsule by mouth 3 (Three) Times a Day As Needed (Toe  pain).    Hypovitaminosis D  -     Vitamin D 25 Hydroxy    Cancer of pancreas, body (CMS/HCC)      Patient with likely gout on the left toe, likely precipitated by eating a high-protein diet he has been losing weight from pancreatic cancer.  He is currently on chemotherapy, seems to be improving since starting this.  Prescribed colchicine, he will be seeing his oncologist later today to make sure this medication will not interact with his treatment.  If not we will consider prednisone.  Otherwise he may need an altered diet or gout prevention.  I recommended that he follow-up with his PCP to discuss this.    Return if symptoms worsen or fail to improve.      Blanca Emerson MD

## 2019-03-11 NOTE — PROGRESS NOTES
Please call patient back with results.  The labs has resulted as positive for gout if not improving with colchicine we can consider prednisone.  Vitamin D level is still low.  I recommend that you take 6000 units daily.  Please follow-up with Dr. Torres for vitamin D recheck and 3-6 months.  If you have a further gout flare, recommend that she talk to Dr. Torres for long-term management.    Thank you

## 2019-03-28 ENCOUNTER — OFFICE VISIT (OUTPATIENT)
Dept: FAMILY MEDICINE CLINIC | Facility: CLINIC | Age: 49
End: 2019-03-28

## 2019-03-28 VITALS
HEART RATE: 84 BPM | OXYGEN SATURATION: 99 % | BODY MASS INDEX: 19.88 KG/M2 | DIASTOLIC BLOOD PRESSURE: 62 MMHG | WEIGHT: 142 LBS | SYSTOLIC BLOOD PRESSURE: 110 MMHG | TEMPERATURE: 98.3 F | HEIGHT: 71 IN | RESPIRATION RATE: 16 BRPM

## 2019-03-28 DIAGNOSIS — M10.9 ACUTE GOUT INVOLVING TOE, UNSPECIFIED CAUSE, UNSPECIFIED LATERALITY: Primary | ICD-10-CM

## 2019-03-28 PROBLEM — C25.0 MALIGNANT TUMOR OF HEAD OF PANCREAS (HCC): Status: ACTIVE | Noted: 2018-07-17

## 2019-03-28 PROBLEM — R07.9 CHEST PAIN: Status: ACTIVE | Noted: 2018-10-05

## 2019-03-28 PROBLEM — E44.0 MALNUTRITION OF MODERATE DEGREE (HCC): Status: ACTIVE | Noted: 2018-10-05

## 2019-03-28 PROBLEM — K83.09 ACUTE CHOLANGITIS: Status: ACTIVE | Noted: 2018-11-07

## 2019-03-28 PROBLEM — N18.9 CHRONIC KIDNEY DISEASE: Status: ACTIVE | Noted: 2018-10-05

## 2019-03-28 PROBLEM — Z01.818 ENCOUNTER FOR OTHER PREPROCEDURAL EXAMINATION: Status: ACTIVE | Noted: 2018-10-05

## 2019-03-28 PROBLEM — E78.5 DYSLIPIDEMIA: Status: ACTIVE | Noted: 2018-10-05

## 2019-03-28 PROBLEM — F39 MOOD DISORDER (HCC): Status: ACTIVE | Noted: 2019-03-01

## 2019-03-28 PROBLEM — R11.0 NAUSEA: Status: ACTIVE | Noted: 2018-10-05

## 2019-03-28 PROBLEM — Z15.09: Status: ACTIVE | Noted: 2019-01-24

## 2019-03-28 PROBLEM — C25.9: Status: ACTIVE | Noted: 2019-01-24

## 2019-03-28 PROBLEM — R06.00 DYSPNEA: Status: ACTIVE | Noted: 2018-10-05

## 2019-03-28 PROBLEM — E43 SEVERE PROTEIN-CALORIE MALNUTRITION (HCC): Status: ACTIVE | Noted: 2018-11-08

## 2019-03-28 PROCEDURE — 99213 OFFICE O/P EST LOW 20 MIN: CPT | Performed by: FAMILY MEDICINE

## 2019-03-28 NOTE — PROGRESS NOTES
Landon Howell is a 48 y.o. male.     Chief Complaint   Patient presents with   • Gout     patient is following up on gout, he sa REYNA hutchins for this.        HPI     Patient presents the office today with a follow-up for gout of his toe.  Has improved greatly with the use of colchicine.  Continues to have some mild discomfort.  Using anti-inflammatories with good results.    The following portions of the patient's history were reviewed and updated as appropriate: allergies, current medications, past family history, past medical history, past social history, past surgical history and problem list.    Review of Systems   Constitutional: Negative for activity change.   All other systems reviewed and are negative.      Objective  Vitals:    03/28/19 1533   BP: 110/62   Pulse: 84   Resp: 16   Temp: 98.3 °F (36.8 °C)   SpO2: 99%       Physical Exam   Constitutional: He is oriented to person, place, and time. He appears well-developed and well-nourished. No distress.   Musculoskeletal:        Left foot: There is tenderness.        Neurological: He is alert and oriented to person, place, and time.   Skin: He is not diaphoretic.   Psychiatric: He has a normal mood and affect. His behavior is normal.   Nursing note and vitals reviewed.        Current Outpatient Medications:   •  cetirizine (zyrTEC) 10 MG tablet, Take 10 mg by mouth Daily., Disp: , Rfl:   •  colchicine 0.6 MG capsule capsule, Take 1 capsule by mouth 3 (Three) Times a Day As Needed (Toe pain)., Disp: 30 capsule, Rfl: 2  •  fenofibrate 160 MG tablet, TAKE 1 TABLET BY MOUTH DAILY, Disp: 90 tablet, Rfl: 0  •  granisetron (SANCUSO) 3.1 MG/24HR, Place 1 patch on the skin as directed by provider., Disp: , Rfl:   •  lidocaine-prilocaine (EMLA) 2.5-2.5 % cream, Apply  topically to the appropriate area as directed., Disp: , Rfl:   •  LORazepam (ATIVAN) 1 MG tablet, Take 1 mg by mouth Daily., Disp: , Rfl:   •  OLANZapine (zyPREXA) 2.5 MG tablet, Take 2.5 mg by mouth  Daily., Disp: , Rfl:   •  omeprazole (priLOSEC) 40 MG capsule, Take 1 capsule by mouth Daily., Disp: 90 capsule, Rfl: 3  •  ondansetron (ZOFRAN) 4 MG tablet, Take 1 tablet by mouth Every 8 (Eight) Hours As Needed for Nausea or Vomiting., Disp: 20 tablet, Rfl: 0  •  pancrelipase, Lip-Prot-Amyl, (CREON) 67706-30706 units capsule delayed-release particles capsule, Take 2 capsules by mouth with meals and one capsule with large snacks, Disp: , Rfl:   •  pantoprazole (PROTONIX) 40 MG EC tablet, Take 1 tablet by mouth Daily., Disp: 90 tablet, Rfl: 3  •  promethazine (PHENERGAN) 25 MG tablet, Take 12.5-25 mg by mouth., Disp: , Rfl:   •  ranitidine (ZANTAC) 150 MG capsule, , Disp: , Rfl:   •  triamcinolone (KENALOG) 0.1 % cream, , Disp: , Rfl: 1  •  zolpidem (AMBIEN) 10 MG tablet, Take 1 tablet by mouth At Night As Needed for sleep., Disp: 10 tablet, Rfl: 0  Current outpatient and discharge medications have been reconciled for the patient.  Reviewed by: Kip Torres MD      Procedures    Lab Results (most recent)     None                  Landon was seen today for gout.    Diagnoses and all orders for this visit:    Acute gout involving toe, unspecified cause, unspecified laterality      Gout flare is improving.  Discussed that if he gets another flare we may consider allopurinol.    Return for As needed.      Kip Torres MD

## 2019-06-26 ENCOUNTER — OFFICE VISIT (OUTPATIENT)
Dept: FAMILY MEDICINE CLINIC | Facility: CLINIC | Age: 49
End: 2019-06-26

## 2019-06-26 VITALS
WEIGHT: 161.8 LBS | OXYGEN SATURATION: 98 % | TEMPERATURE: 98.6 F | HEIGHT: 71 IN | RESPIRATION RATE: 18 BRPM | DIASTOLIC BLOOD PRESSURE: 54 MMHG | BODY MASS INDEX: 22.65 KG/M2 | HEART RATE: 58 BPM | SYSTOLIC BLOOD PRESSURE: 110 MMHG

## 2019-06-26 DIAGNOSIS — E78.5 HYPERLIPIDEMIA, UNSPECIFIED HYPERLIPIDEMIA TYPE: ICD-10-CM

## 2019-06-26 DIAGNOSIS — C25.0 MALIGNANT TUMOR OF HEAD OF PANCREAS (HCC): ICD-10-CM

## 2019-06-26 DIAGNOSIS — Z12.5 SCREENING FOR PROSTATE CANCER: ICD-10-CM

## 2019-06-26 DIAGNOSIS — Z13.1 SCREENING FOR DIABETES MELLITUS: ICD-10-CM

## 2019-06-26 DIAGNOSIS — E78.5 DYSLIPIDEMIA: ICD-10-CM

## 2019-06-26 DIAGNOSIS — C25.1 CANCER OF PANCREAS, BODY (HCC): ICD-10-CM

## 2019-06-26 DIAGNOSIS — E44.0 MALNUTRITION OF MODERATE DEGREE (HCC): ICD-10-CM

## 2019-06-26 DIAGNOSIS — Z00.00 ENCOUNTER FOR HEALTH MAINTENANCE EXAMINATION: Primary | ICD-10-CM

## 2019-06-26 DIAGNOSIS — C25.9 BRCA2-RELATED PANCREATIC CANCER (HCC): ICD-10-CM

## 2019-06-26 DIAGNOSIS — E43 SEVERE PROTEIN-CALORIE MALNUTRITION (HCC): ICD-10-CM

## 2019-06-26 DIAGNOSIS — Z15.09 BRCA2-RELATED PANCREATIC CANCER (HCC): ICD-10-CM

## 2019-06-26 PROBLEM — C43.9 MALIGNANT MELANOMA (HCC): Status: ACTIVE | Noted: 2019-06-03

## 2019-06-26 PROBLEM — N18.9 CHRONIC KIDNEY DISEASE: Status: ACTIVE | Noted: 2018-10-05

## 2019-06-26 PROBLEM — Z15.03 BRCA2 GENE MUTATION POSITIVE IN MALE: Status: ACTIVE | Noted: 2019-06-03

## 2019-06-26 PROBLEM — Z15.01 BRCA2 GENE MUTATION POSITIVE IN MALE: Status: ACTIVE | Noted: 2019-06-03

## 2019-06-26 PROCEDURE — 99214 OFFICE O/P EST MOD 30 MIN: CPT | Performed by: FAMILY MEDICINE

## 2019-06-26 RX ORDER — CHOLECALCIFEROL (VITAMIN D3) 125 MCG
TABLET ORAL
Refills: 3 | COMMUNITY
Start: 2019-06-04

## 2019-06-26 RX ORDER — MULTIPLE VITAMINS W/ MINERALS TAB 9MG-400MCG
1 TAB ORAL DAILY
COMMUNITY

## 2019-06-26 RX ORDER — FERROUS SULFATE 325(65) MG
325 TABLET ORAL 2 TIMES DAILY
COMMUNITY

## 2019-06-26 RX ORDER — CHOLECALCIFEROL (VITAMIN D3) 125 MCG
10 CAPSULE ORAL
COMMUNITY

## 2019-06-28 LAB
25(OH)D3+25(OH)D2 SERPL-MCNC: 35.1 NG/ML (ref 30–100)
ALBUMIN SERPL-MCNC: 4.2 G/DL (ref 3.5–5.5)
ALBUMIN/GLOB SERPL: 1.4 {RATIO} (ref 1.2–2.2)
ALP SERPL-CCNC: 167 IU/L (ref 39–117)
ALT SERPL-CCNC: 51 IU/L (ref 0–44)
AST SERPL-CCNC: 39 IU/L (ref 0–40)
BASOPHILS # BLD AUTO: 0 X10E3/UL (ref 0–0.2)
BASOPHILS NFR BLD AUTO: 1 %
BILIRUB SERPL-MCNC: 0.4 MG/DL (ref 0–1.2)
BUN SERPL-MCNC: 16 MG/DL (ref 6–24)
BUN/CREAT SERPL: 15 (ref 9–20)
CALCIUM SERPL-MCNC: 9.6 MG/DL (ref 8.7–10.2)
CANCER AG19-9 SERPL-ACNC: 14 U/ML (ref 0–35)
CHLORIDE SERPL-SCNC: 104 MMOL/L (ref 96–106)
CHOLEST SERPL-MCNC: 144 MG/DL (ref 100–199)
CO2 SERPL-SCNC: 23 MMOL/L (ref 20–29)
COPPER SERPL-MCNC: 86 UG/DL (ref 72–166)
CREAT SERPL-MCNC: 1.09 MG/DL (ref 0.76–1.27)
EOSINOPHIL # BLD AUTO: 0.2 X10E3/UL (ref 0–0.4)
EOSINOPHIL NFR BLD AUTO: 5 %
ERYTHROCYTE [DISTWIDTH] IN BLOOD BY AUTOMATED COUNT: 14.1 % (ref 12.3–15.4)
GLOBULIN SER CALC-MCNC: 2.9 G/DL (ref 1.5–4.5)
GLUCOSE SERPL-MCNC: 96 MG/DL (ref 65–99)
HBA1C MFR BLD: 5.1 % (ref 4.8–5.6)
HCT VFR BLD AUTO: 39.5 % (ref 37.5–51)
HDLC SERPL-MCNC: 27 MG/DL
HGB BLD-MCNC: 13.4 G/DL (ref 13–17.7)
IMM GRANULOCYTES # BLD AUTO: 0 X10E3/UL (ref 0–0.1)
IMM GRANULOCYTES NFR BLD AUTO: 0 %
IRON SERPL-MCNC: 108 UG/DL (ref 38–169)
LDLC SERPL CALC-MCNC: 89 MG/DL (ref 0–99)
LYMPHOCYTES # BLD AUTO: 0.9 X10E3/UL (ref 0.7–3.1)
LYMPHOCYTES NFR BLD AUTO: 24 %
MCH RBC QN AUTO: 30.8 PG (ref 26.6–33)
MCHC RBC AUTO-ENTMCNC: 33.9 G/DL (ref 31.5–35.7)
MCV RBC AUTO: 91 FL (ref 79–97)
MONOCYTES # BLD AUTO: 0.2 X10E3/UL (ref 0.1–0.9)
MONOCYTES NFR BLD AUTO: 7 %
NEUTROPHILS # BLD AUTO: 2.3 X10E3/UL (ref 1.4–7)
NEUTROPHILS NFR BLD AUTO: 63 %
PLATELET # BLD AUTO: 199 X10E3/UL (ref 150–450)
POTASSIUM SERPL-SCNC: 4.5 MMOL/L (ref 3.5–5.2)
PROT SERPL-MCNC: 7.1 G/DL (ref 6–8.5)
PSA SERPL-MCNC: 1 NG/ML (ref 0–4)
RBC # BLD AUTO: 4.35 X10E6/UL (ref 4.14–5.8)
SODIUM SERPL-SCNC: 142 MMOL/L (ref 134–144)
TRIGL SERPL-MCNC: 139 MG/DL (ref 0–149)
VIT B12 SERPL-MCNC: 491 PG/ML (ref 232–1245)
VLDLC SERPL CALC-MCNC: 28 MG/DL (ref 5–40)
WBC # BLD AUTO: 3.7 X10E3/UL (ref 3.4–10.8)
ZINC SERPL-MCNC: 91 UG/DL (ref 56–134)

## 2019-08-06 RX ORDER — PANTOPRAZOLE SODIUM 40 MG/1
40 TABLET, DELAYED RELEASE ORAL DAILY
Qty: 90 TABLET | Refills: 3 | Status: SHIPPED | OUTPATIENT
Start: 2019-08-06 | End: 2019-12-23 | Stop reason: SDUPTHER

## 2019-08-21 ENCOUNTER — PRIOR AUTHORIZATION (OUTPATIENT)
Dept: FAMILY MEDICINE CLINIC | Facility: CLINIC | Age: 49
End: 2019-08-21

## 2019-08-28 ENCOUNTER — PRIOR AUTHORIZATION (OUTPATIENT)
Dept: FAMILY MEDICINE CLINIC | Facility: CLINIC | Age: 49
End: 2019-08-28

## 2019-09-30 ENCOUNTER — TELEPHONE (OUTPATIENT)
Dept: FAMILY MEDICINE CLINIC | Facility: CLINIC | Age: 49
End: 2019-09-30

## 2019-09-30 NOTE — TELEPHONE ENCOUNTER
Regarding: FW: Non-Urgent Medical Question  Contact: [phone]      ----- Message -----  From: Roxanne Corona MA  Sent: 9/27/2019   9:36 AM  To: Julita Lerner  Subject: FW: Non-Urgent Medical Question                      ----- Message -----  From: Kip Torres MD  Sent: 9/25/2019   2:56 PM  To: Roxanne Corona MA  Subject: FW: Non-Urgent Medical Question                      ----- Message -----  From: Roxanne Corona MA  Sent: 9/3/2019  12:38 PM  To: Kip Torres MD  Subject: FW: Non-Urgent Medical Question                      ----- Message -----  From: Landon Howell  Sent: 9/3/2019  11:29 AM  To: [facility]  Subject: Non-Urgent Medical Question                      ----- Message from Aruna, Generic sent at 9/3/2019 11:29 AM EDT -----    Hi Dr. Torres,    I recently saw Dr. England for my 3-month MRI exam for my diagnosis of pancreatic cancer.  The good news is that my results came back negative for re-occurrence.  I will do another 3-month exam at [facility] in November.    Due to the chemo that I had and some long term implications for a small degradation of my immune system, Dr. England suggested that I contact you to schedule a vaccination for shingles.  I did previously have chicken pox as a child.  Can we schedule this vaccination?    Thank you very much.

## 2019-10-15 ENCOUNTER — TELEPHONE (OUTPATIENT)
Dept: FAMILY MEDICINE CLINIC | Facility: CLINIC | Age: 49
End: 2019-10-15

## 2019-10-15 NOTE — TELEPHONE ENCOUNTER
Pt requesting motion sickness patch for vacation and is curious if he can be authorized to received the shingrix vaccine? Please advise.

## 2019-10-16 RX ORDER — SCOLOPAMINE TRANSDERMAL SYSTEM 1 MG/1
1 PATCH, EXTENDED RELEASE TRANSDERMAL
Qty: 10 PATCH | Refills: 1 | Status: SHIPPED | OUTPATIENT
Start: 2019-10-16 | End: 2020-01-09

## 2019-10-16 NOTE — TELEPHONE ENCOUNTER
Prescription has been sent to the patient's pharmacy.  As for the shingles vaccine the best course of action is for him to contact his insurance company to see if they will pay for them vaccine.  If so I am happy to place an order.

## 2019-12-23 RX ORDER — PANTOPRAZOLE SODIUM 40 MG/1
40 TABLET, DELAYED RELEASE ORAL DAILY
Qty: 90 TABLET | Refills: 3 | Status: SHIPPED | OUTPATIENT
Start: 2019-12-23 | End: 2020-03-19 | Stop reason: SDUPTHER

## 2019-12-26 ENCOUNTER — PRIOR AUTHORIZATION (OUTPATIENT)
Dept: FAMILY MEDICINE CLINIC | Facility: CLINIC | Age: 49
End: 2019-12-26

## 2020-01-09 ENCOUNTER — OFFICE VISIT (OUTPATIENT)
Dept: FAMILY MEDICINE CLINIC | Facility: CLINIC | Age: 50
End: 2020-01-09

## 2020-01-09 VITALS
BODY MASS INDEX: 23.8 KG/M2 | WEIGHT: 170 LBS | SYSTOLIC BLOOD PRESSURE: 122 MMHG | DIASTOLIC BLOOD PRESSURE: 70 MMHG | HEART RATE: 68 BPM | HEIGHT: 71 IN | TEMPERATURE: 98.3 F | OXYGEN SATURATION: 98 % | RESPIRATION RATE: 16 BRPM

## 2020-01-09 DIAGNOSIS — Z15.09 BRCA2-RELATED PANCREATIC CANCER (HCC): ICD-10-CM

## 2020-01-09 DIAGNOSIS — C43.59 MALIGNANT MELANOMA OF TORSO EXCLUDING BREAST (HCC): ICD-10-CM

## 2020-01-09 DIAGNOSIS — C25.9 BRCA2-RELATED PANCREATIC CANCER (HCC): ICD-10-CM

## 2020-01-09 DIAGNOSIS — C25.0 MALIGNANT TUMOR OF HEAD OF PANCREAS (HCC): ICD-10-CM

## 2020-01-09 DIAGNOSIS — Z15.03 BRCA2 GENE MUTATION POSITIVE IN MALE: Primary | ICD-10-CM

## 2020-01-09 DIAGNOSIS — Z15.09 BRCA2 GENE MUTATION POSITIVE IN MALE: Primary | ICD-10-CM

## 2020-01-09 DIAGNOSIS — Z15.01 BRCA2 GENE MUTATION POSITIVE IN MALE: Primary | ICD-10-CM

## 2020-01-09 PROCEDURE — 99214 OFFICE O/P EST MOD 30 MIN: CPT | Performed by: FAMILY MEDICINE

## 2020-01-09 NOTE — PROGRESS NOTES
Landon Howell is a 49 y.o. male.     Chief Complaint   Patient presents with   • Elevated PSA     patient needs a PSA rechecked,   • Breast Problem     patient needs to be checked for breast cancer.       HPI     Patient presents to the office today to discuss a number of issues.  Patient has been found to have a BRCA2 gene mutation.  Has had 2 separate episodes of malignant melanoma as well as pancreatic cancer.  Both showed positive BRCA2 origins.  Patient is at considerable risk for prostate cancer, breast cancer, pancreatic cancer, and melanoma skin cancer.  Patient has been following up with oncology and surgical oncology at regular intervals.  Patient had a CT scan recently that was clear and are not concerning for any metastatic disease.  Patient denies any issues with breast tissue.  No issues with erectile dysfunction or difficulty urinating.  Does see a dermatologist regularly.    The following portions of the patient's history were reviewed and updated as appropriate: allergies, current medications, past family history, past medical history, past social history, past surgical history and problem list.    Review of Systems   Constitutional: Negative for activity change.   Allergic/Immunologic: Negative for environmental allergies.   All other systems reviewed and are negative.    I have reviewed the ROS as documented by the MA. Kip Torres MD    Objective  Vitals:    01/09/20 1504   BP: 122/70   Pulse: 68   Resp: 16   Temp: 98.3 °F (36.8 °C)   SpO2: 98%     Body mass index is 23.71 kg/m².    Physical Exam   Constitutional: He is oriented to person, place, and time. He appears well-developed and well-nourished. No distress.   HENT:   Head: Normocephalic and atraumatic.   Right Ear: External ear normal.   Left Ear: External ear normal.   Nose: Nose normal.   Mouth/Throat: Oropharynx is clear and moist.   Eyes: Pupils are equal, round, and reactive to light. Conjunctivae and EOM are normal. Right  eye exhibits no discharge. Left eye exhibits no discharge. No scleral icterus.   Cardiovascular: Normal rate, regular rhythm and normal heart sounds.   Pulmonary/Chest: Effort normal and breath sounds normal. No respiratory distress. He has no wheezes. He has no rales. Right breast exhibits no inverted nipple, no mass, no nipple discharge, no skin change and no tenderness. Left breast exhibits no inverted nipple, no mass, no nipple discharge, no skin change and no tenderness. Breasts are symmetrical.   Abdominal: Soft. Bowel sounds are normal. He exhibits no distension. There is no tenderness.   Genitourinary: Rectum normal and prostate normal. Rectal exam shows no external hemorrhoid, no internal hemorrhoid, no fissure, no mass and no tenderness. Prostate is not enlarged and not tender.   Neurological: He is alert and oriented to person, place, and time.   Skin: Skin is warm and dry. He is not diaphoretic.   Nursing note and vitals reviewed.        Current Outpatient Medications:   •  Ergocalciferol (VITAMIN D2) 2000 units tablet, TK 1 T PO D AFTER COMPLETION OF WEEKLY DOSE FOR 8 WEEKS, Disp: , Rfl: 3  •  Multiple Vitamins-Minerals (MULTIVITAMIN WITH MINERALS) tablet tablet, Take 1 tablet by mouth Daily., Disp: , Rfl:   •  pantoprazole (PROTONIX) 40 MG EC tablet, Take 1 tablet by mouth Daily., Disp: 90 tablet, Rfl: 3  •  cetirizine (zyrTEC) 10 MG tablet, Take 10 mg by mouth Daily., Disp: , Rfl:   •  colchicine 0.6 MG capsule capsule, Take 1 capsule by mouth 3 (Three) Times a Day As Needed (Toe pain)., Disp: 30 capsule, Rfl: 2  •  ferrous sulfate 325 (65 FE) MG tablet, Take 325 mg by mouth 2 (Two) Times a Day., Disp: , Rfl:   •  melatonin 5 MG tablet tablet, Take 10 mg by mouth., Disp: , Rfl:   Current outpatient and discharge medications have been reconciled for the patient.  Reviewed by: Kip Torres MD      Procedures    Lab Results (most recent)     None                  Landon was seen today for elevated psa  and breast problem.    Diagnoses and all orders for this visit:    BRCA2 gene mutation positive in male  -     Mammo Screening Bilateral With CAD; Future  -     PSA Screen  -     Cancer Antigen 19-9    Malignant melanoma of torso excluding breast (CMS/HCC)  -     Mammo Screening Bilateral With CAD; Future  -     PSA Screen  -     Cancer Antigen 19-9    Malignant tumor of head of pancreas (CMS/HCC)  -     Mammo Screening Bilateral With CAD; Future  -     PSA Screen  -     Cancer Antigen 19-9    BRCA2-related pancreatic cancer (CMS/HCC)  -     Mammo Screening Bilateral With CAD; Future  -     PSA Screen  -     Cancer Antigen 19-9    Breast exam normal.  Prostate exam is normal.  We will get blood work as above.  PSA was drawn before rectal exam.  Will communicate results to patient when available.  Advised follow-up on regular intervals with his dermatologist.      Return in about 3 months (around 4/9/2020) for Recheck.      Kip Torres MD

## 2020-01-10 LAB
CANCER AG19-9 SERPL-ACNC: 14 U/ML (ref 0–35)
PSA SERPL-MCNC: 0.89 NG/ML (ref 0–4)

## 2020-01-16 ENCOUNTER — TELEPHONE (OUTPATIENT)
Dept: FAMILY MEDICINE CLINIC | Facility: CLINIC | Age: 50
End: 2020-01-16

## 2020-01-16 DIAGNOSIS — Z15.03 BRCA2 GENE MUTATION POSITIVE IN MALE: Primary | ICD-10-CM

## 2020-01-16 DIAGNOSIS — Z15.01 BRCA2 GENE MUTATION POSITIVE IN MALE: Primary | ICD-10-CM

## 2020-01-16 DIAGNOSIS — Z15.09 BRCA2 GENE MUTATION POSITIVE IN MALE: Primary | ICD-10-CM

## 2020-01-16 NOTE — TELEPHONE ENCOUNTER
Radiology is requesting a change in the recent mammography order that was placed on 1/9/2020. Since pt tested positive for the gene the mammo order should be changed to diagnostic. Please advise.

## 2020-01-22 ENCOUNTER — APPOINTMENT (OUTPATIENT)
Dept: MAMMOGRAPHY | Facility: HOSPITAL | Age: 50
End: 2020-01-22

## 2020-01-23 DIAGNOSIS — Z15.01 BRCA1 POSITIVE: Primary | ICD-10-CM

## 2020-01-23 DIAGNOSIS — Z15.01 BRCA GENE POSITIVE: ICD-10-CM

## 2020-01-23 DIAGNOSIS — Z15.03: ICD-10-CM

## 2020-01-23 DIAGNOSIS — Z15.09 BRCA GENE POSITIVE: ICD-10-CM

## 2020-01-23 DIAGNOSIS — Z15.09 BRCA1 POSITIVE: Primary | ICD-10-CM

## 2020-01-23 DIAGNOSIS — Z15.09 GENETIC SUSCEPTIBILITY TO OTHER MALIGNANT NEOPLASM: ICD-10-CM

## 2020-02-07 ENCOUNTER — CLINICAL SUPPORT (OUTPATIENT)
Dept: FAMILY MEDICINE CLINIC | Facility: CLINIC | Age: 50
End: 2020-02-07

## 2020-02-07 DIAGNOSIS — Z23 NEED FOR SHINGLES VACCINE: Primary | ICD-10-CM

## 2020-02-07 PROCEDURE — 90750 HZV VACC RECOMBINANT IM: CPT | Performed by: FAMILY MEDICINE

## 2020-02-07 PROCEDURE — 90471 IMMUNIZATION ADMIN: CPT | Performed by: FAMILY MEDICINE

## 2020-02-12 ENCOUNTER — HOSPITAL ENCOUNTER (OUTPATIENT)
Dept: ULTRASOUND IMAGING | Facility: HOSPITAL | Age: 50
End: 2020-02-12

## 2020-02-12 ENCOUNTER — HOSPITAL ENCOUNTER (OUTPATIENT)
Dept: MAMMOGRAPHY | Facility: HOSPITAL | Age: 50
Discharge: HOME | End: 2020-02-12
Admitting: FAMILY MEDICINE

## 2020-02-12 DIAGNOSIS — Z15.03 BRCA2 GENE MUTATION POSITIVE IN MALE: ICD-10-CM

## 2020-02-12 DIAGNOSIS — Z15.01 BRCA2 GENE MUTATION POSITIVE IN MALE: ICD-10-CM

## 2020-02-12 DIAGNOSIS — Z15.09 BRCA2 GENE MUTATION POSITIVE IN MALE: ICD-10-CM

## 2020-02-12 PROCEDURE — 77066 DX MAMMO INCL CAD BI: CPT

## 2020-03-19 RX ORDER — PANTOPRAZOLE SODIUM 40 MG/1
40 TABLET, DELAYED RELEASE ORAL DAILY
Qty: 90 TABLET | Refills: 3 | Status: SHIPPED | OUTPATIENT
Start: 2020-03-19

## 2020-05-28 ENCOUNTER — CLINICAL SUPPORT (OUTPATIENT)
Dept: FAMILY MEDICINE CLINIC | Facility: CLINIC | Age: 50
End: 2020-05-28

## 2020-05-28 DIAGNOSIS — Z23 NEED FOR VACCINATION: Primary | ICD-10-CM

## 2020-05-28 PROCEDURE — 90471 IMMUNIZATION ADMIN: CPT | Performed by: FAMILY MEDICINE

## 2020-05-28 PROCEDURE — 90750 HZV VACC RECOMBINANT IM: CPT | Performed by: FAMILY MEDICINE

## 2020-07-07 ENCOUNTER — OFFICE VISIT (OUTPATIENT)
Dept: FAMILY MEDICINE CLINIC | Facility: CLINIC | Age: 50
End: 2020-07-07

## 2020-07-07 VITALS
BODY MASS INDEX: 23.8 KG/M2 | TEMPERATURE: 97.9 F | SYSTOLIC BLOOD PRESSURE: 98 MMHG | WEIGHT: 170 LBS | OXYGEN SATURATION: 100 % | HEIGHT: 71 IN | HEART RATE: 63 BPM | DIASTOLIC BLOOD PRESSURE: 64 MMHG
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
## 2020-07-07 DIAGNOSIS — R53.83 FATIGUE, UNSPECIFIED TYPE: ICD-10-CM

## 2020-07-07 PROCEDURE — 99214 OFFICE O/P EST MOD 30 MIN: CPT | Performed by: FAMILY MEDICINE

## 2020-07-07 RX ORDER — DOXYCYCLINE 100 MG/1
100 CAPSULE ORAL 2 TIMES DAILY
Qty: 28 CAPSULE | Refills: 0 | Status: SHIPPED | OUTPATIENT
Start: 2020-07-07 | End: 2020-07-21

## 2020-07-07 RX ORDER — HYOSCYAMINE SULFATE 0.125 MG
TABLET ORAL
COMMUNITY
Start: 2020-05-19

## 2020-07-07 NOTE — PROGRESS NOTES
Landon Howell is a 50 y.o. male.     Chief Complaint   Patient presents with   • Fatigue     last week has some fatigue and acheness looks very similar to lyme disease he had 7 years ago        HPI     Patient presents the office today to discuss some issues that are new to me.  Patient has a history of pancreatic cancer with post Whipple.  He has been having some fatigue as well as some joint pains.  This did happen previously when he had been exposed to a tick bite and actually had Lyme disease previously.  Symptoms are getting worse.    The following portions of the patient's history were reviewed and updated as appropriate: allergies, current medications, past family history, past medical history, past social history, past surgical history and problem list.    Review of Systems   Constitutional: Positive for fatigue.   Musculoskeletal: Positive for myalgias.   All other systems reviewed and are negative.    I have reviewed the ROS as documented by the MA. Kip Torres MD    Objective  Vitals:    07/07/20 0751   BP: 98/64   Pulse: 63   Temp: 97.9 °F (36.6 °C)   SpO2: 100%     Body mass index is 23.71 kg/m².    Physical Exam   Constitutional: He is oriented to person, place, and time. He appears well-developed and well-nourished. No distress.   Neurological: He is alert and oriented to person, place, and time.   Skin: He is not diaphoretic.   Psychiatric: He has a normal mood and affect. His behavior is normal.   Nursing note and vitals reviewed.        Current Outpatient Medications:   •  cetirizine (zyrTEC) 10 MG tablet, Take 10 mg by mouth Daily., Disp: , Rfl:   •  colchicine 0.6 MG capsule capsule, Take 1 capsule by mouth 3 (Three) Times a Day As Needed (Toe pain)., Disp: 30 capsule, Rfl: 2  •  Ergocalciferol (VITAMIN D2) 2000 units tablet, TK 1 T PO D AFTER COMPLETION OF WEEKLY DOSE FOR 8 WEEKS, Disp: , Rfl: 3  •  ferrous sulfate 325 (65 FE) MG tablet, Take 325 mg by mouth 2 (Two) Times a Day.,  Disp: , Rfl:   •  hyoscyamine (ANASPAZ,LEVSIN) 0.125 MG tablet, TK 1 T PO Q 4 H PRF CRAMPING, Disp: , Rfl:   •  melatonin 5 MG tablet tablet, Take 10 mg by mouth., Disp: , Rfl:   •  Multiple Vitamins-Minerals (MULTIVITAMIN WITH MINERALS) tablet tablet, Take 1 tablet by mouth Daily., Disp: , Rfl:   •  pancrelipase, Lip-Prot-Amyl, (CREON) 6000 units capsule delayed-release particles capsule, Take 6,000 units of lipase by mouth 3 (Three) Times a Day With Meals., Disp: , Rfl:   •  pantoprazole (PROTONIX) 40 MG EC tablet, Take 1 tablet by mouth Daily., Disp: 90 tablet, Rfl: 3  •  doxycycline (MONODOX) 100 MG capsule, Take 1 capsule by mouth 2 (Two) Times a Day for 14 days., Disp: 28 capsule, Rfl: 0  Current outpatient and discharge medications have been reconciled for the patient.  Reviewed by: Kip Torres MD      Procedures    Lab Results (most recent)     None                  Landon was seen today for fatigue.    Diagnoses and all orders for this visit:    Tick bite, initial encounter  -     doxycycline (MONODOX) 100 MG capsule; Take 1 capsule by mouth 2 (Two) Times a Day for 14 days.  -     CBC Auto Differential  -     Comprehensive Metabolic Panel  -     Thyroid Panel With TSH  -     Vitamin D 25 Hydroxy  -     Vitamin B12    Fatigue, unspecified type  -     doxycycline (MONODOX) 100 MG capsule; Take 1 capsule by mouth 2 (Two) Times a Day for 14 days.  -     CBC Auto Differential  -     Comprehensive Metabolic Panel  -     Thyroid Panel With TSH  -     Vitamin D 25 Hydroxy  -     Vitamin B12      Will go ahead and treat prophylactically.  Will get labs as above.  Will communicate results to patient when available.    Return for As needed.      Kip Torres MD    Answers for HPI/ROS submitted by the patient on 7/6/2020   What is the primary reason for your visit?: Other  Please describe your symptoms.: Possible Lyme symptoms - mild aches and pains,  (I have previously had Lyme and am familiar with the  symptoms), , No fever, no respiratory problems, no loss of taste (i.e. nothing that make me think it is Covid)  Have you had these symptoms before?: Yes  How long have you been having these symptoms?: Greater than 2 weeks  Please list any medications you are currently taking for this condition.: None  Please describe any probable cause for these symptoms. : Possible tick bite

## 2020-07-10 LAB
25(OH)D3+25(OH)D2 SERPL-MCNC: 48.3 NG/ML (ref 30–100)
ALBUMIN SERPL-MCNC: 4.6 G/DL (ref 3.5–5.2)
ALBUMIN/GLOB SERPL: 1.7 G/DL
ALP SERPL-CCNC: 125 U/L (ref 39–117)
ALT SERPL-CCNC: 29 U/L (ref 1–41)
AST SERPL-CCNC: 21 U/L (ref 1–40)
BASOPHILS # BLD AUTO: 0.02 10*3/MM3 (ref 0–0.2)
BASOPHILS NFR BLD AUTO: 0.4 % (ref 0–1.5)
BILIRUB SERPL-MCNC: 0.4 MG/DL (ref 0–1.2)
BUN SERPL-MCNC: 14 MG/DL (ref 6–20)
BUN/CREAT SERPL: 13 (ref 7–25)
CALCIUM SERPL-MCNC: 9.5 MG/DL (ref 8.6–10.5)
CHLORIDE SERPL-SCNC: 104 MMOL/L (ref 98–107)
CO2 SERPL-SCNC: 23.8 MMOL/L (ref 22–29)
CREAT SERPL-MCNC: 1.08 MG/DL (ref 0.76–1.27)
EOSINOPHIL # BLD AUTO: 0.19 10*3/MM3 (ref 0–0.4)
EOSINOPHIL NFR BLD AUTO: 4.1 % (ref 0.3–6.2)
ERYTHROCYTE [DISTWIDTH] IN BLOOD BY AUTOMATED COUNT: 12.5 % (ref 12.3–15.4)
FT4I SERPL CALC-MCNC: 1.3 (ref 1.2–4.9)
GLOBULIN SER CALC-MCNC: 2.7 GM/DL
GLUCOSE SERPL-MCNC: 107 MG/DL (ref 65–99)
HCT VFR BLD AUTO: 41.4 % (ref 37.5–51)
HGB BLD-MCNC: 14.1 G/DL (ref 13–17.7)
IMM GRANULOCYTES # BLD AUTO: 0.01 10*3/MM3 (ref 0–0.05)
IMM GRANULOCYTES NFR BLD AUTO: 0.2 % (ref 0–0.5)
LYMPHOCYTES # BLD AUTO: 1.14 10*3/MM3 (ref 0.7–3.1)
LYMPHOCYTES NFR BLD AUTO: 24.4 % (ref 19.6–45.3)
MCH RBC QN AUTO: 30.5 PG (ref 26.6–33)
MCHC RBC AUTO-ENTMCNC: 34.1 G/DL (ref 31.5–35.7)
MCV RBC AUTO: 89.4 FL (ref 79–97)
MONOCYTES # BLD AUTO: 0.42 10*3/MM3 (ref 0.1–0.9)
MONOCYTES NFR BLD AUTO: 9 % (ref 5–12)
NEUTROPHILS # BLD AUTO: 2.89 10*3/MM3 (ref 1.7–7)
NEUTROPHILS NFR BLD AUTO: 61.9 % (ref 42.7–76)
NRBC BLD AUTO-RTO: 0 /100 WBC (ref 0–0.2)
PLATELET # BLD AUTO: 202 10*3/MM3 (ref 140–450)
POTASSIUM SERPL-SCNC: 3.9 MMOL/L (ref 3.5–5.2)
PROT SERPL-MCNC: 7.3 G/DL (ref 6–8.5)
RBC # BLD AUTO: 4.63 10*6/MM3 (ref 4.14–5.8)
SODIUM SERPL-SCNC: 139 MMOL/L (ref 136–145)
T3RU NFR SERPL: 25 % (ref 24–39)
T4 SERPL-MCNC: 5.2 UG/DL (ref 4.5–12)
TSH SERPL DL<=0.005 MIU/L-ACNC: 1.65 UIU/ML (ref 0.45–4.5)
VIT B12 SERPL-MCNC: 581 PG/ML (ref 211–946)
WBC # BLD AUTO: 4.67 10*3/MM3 (ref 3.4–10.8)

## 2021-01-21 NOTE — PROGRESS NOTES
Landon Howell is a 49 y.o. male.     Chief Complaint   Patient presents with   • Annual Exam     patient needs a physical exam.       HPI     Patient presents to the office today for health maintenance exam.  Patient recently completed his chemotherapy for pancreatic cancer.  He has been following up with [facility] as well as oncology here in [place].  Has been seen for nutrition deficiencies and malnutrition.  He does carry the BRCA2 gene mutation.  He also has a history of hyperlipidemia.  Feels overall that he is been doing well.  Is concerned about his cancer returning.  This is understandable.  No changes in family history of illness.  Patient is a non-smoker.    The following portions of the patient's history were reviewed and updated as appropriate: allergies, current medications, past family history, past medical history, past social history, past surgical history and problem list.    Review of Systems   Constitutional: Negative for activity change.   All other systems reviewed and are negative.      Objective  Vitals:    06/26/19 1027   BP: 110/54   Pulse: 58   Resp: 18   Temp: 98.6 °F (37 °C)   SpO2: 98%       Physical Exam   Constitutional: He is oriented to person, place, and time. He appears well-developed and well-nourished. No distress.   HENT:   Head: Normocephalic and atraumatic.   Right Ear: External ear normal.   Left Ear: External ear normal.   Nose: Nose normal.   Mouth/Throat: Oropharynx is clear and moist.   Eyes: Conjunctivae and EOM are normal. Pupils are equal, round, and reactive to light. Right eye exhibits no discharge. Left eye exhibits no discharge. No scleral icterus.   Neck: Normal range of motion. Neck supple.   Cardiovascular: Normal rate, regular rhythm and normal heart sounds. Exam reveals no friction rub.   No murmur heard.  Pulmonary/Chest: Effort normal and breath sounds normal. No respiratory distress. He has no wheezes. He has no rales.   Abdominal: Soft.  Bowel sounds are normal. He exhibits no distension. There is no tenderness. There is no rebound and no guarding.   Genitourinary: Rectum normal and prostate normal.   Lymphadenopathy:     He has no cervical adenopathy.   Neurological: He is alert and oriented to person, place, and time.   Skin: Skin is warm and dry. He is not diaphoretic.   Nursing note and vitals reviewed.        Current Outpatient Medications:   •  cetirizine (zyrTEC) 10 MG tablet, Take 10 mg by mouth Daily., Disp: , Rfl:   •  colchicine 0.6 MG capsule capsule, Take 1 capsule by mouth 3 (Three) Times a Day As Needed (Toe pain)., Disp: 30 capsule, Rfl: 2  •  Ergocalciferol (VITAMIN D2) 2000 units tablet, TK 1 T PO D AFTER COMPLETION OF WEEKLY DOSE FOR 8 WEEKS, Disp: , Rfl: 3  •  ferrous sulfate 325 (65 FE) MG tablet, Take 325 mg by mouth 2 (Two) Times a Day., Disp: , Rfl:   •  melatonin 5 MG tablet tablet, Take 10 mg by mouth., Disp: , Rfl:   •  Multiple Vitamins-Minerals (MULTIVITAMIN WITH MINERALS) tablet tablet, Take 1 tablet by mouth Daily., Disp: , Rfl:   •  OLANZapine (zyPREXA) 2.5 MG tablet, Take 2.5 mg by mouth Daily., Disp: , Rfl:   •  pancrelipase, Lip-Prot-Amyl, (CREON) 32766-73546 units capsule delayed-release particles capsule, Take 2 capsules by mouth with meals and one capsule with large snacks, Disp: , Rfl:   •  pantoprazole (PROTONIX) 40 MG EC tablet, Take 1 tablet by mouth Daily., Disp: 90 tablet, Rfl: 3  •  ranitidine (ZANTAC) 150 MG capsule, , Disp: , Rfl:   Current outpatient and discharge medications have been reconciled for the patient.  Reviewed by: Kip Torres MD      Procedures    Lab Results (most recent)     None                  Landon was seen today for annual exam.    Diagnoses and all orders for this visit:    Encounter for health maintenance examination    Cancer of pancreas, body (CMS/HCC)  -     PSA Screen  -     Comprehensive Metabolic Panel  -     Hemoglobin A1c  -     Lipid Panel  -     CBC Auto  Differential  -     Vitamin B12  -     Vitamin D 25 Hydroxy  -     Iron level  -     Copper, Serum  -     Cancer Antigen 19-9  -     Zinc    Malnutrition of moderate degree (CMS/HCC)  -     PSA Screen  -     Comprehensive Metabolic Panel  -     Hemoglobin A1c  -     Lipid Panel  -     CBC Auto Differential  -     Vitamin B12  -     Vitamin D 25 Hydroxy  -     Iron level  -     Copper, Serum  -     Cancer Antigen 19-9  -     Zinc    Severe protein-calorie malnutrition (CMS/HCC)  -     PSA Screen  -     Comprehensive Metabolic Panel  -     Hemoglobin A1c  -     Lipid Panel  -     CBC Auto Differential  -     Vitamin B12  -     Vitamin D 25 Hydroxy  -     Iron level  -     Copper, Serum  -     Cancer Antigen 19-9  -     Zinc    BRCA2-related pancreatic cancer (CMS/HCC)  -     PSA Screen  -     Comprehensive Metabolic Panel  -     Hemoglobin A1c  -     Lipid Panel  -     CBC Auto Differential  -     Vitamin B12  -     Vitamin D 25 Hydroxy  -     Iron level  -     Copper, Serum  -     Cancer Antigen 19-9  -     Zinc    Malignant tumor of head of pancreas (CMS/HCC)  -     PSA Screen  -     Comprehensive Metabolic Panel  -     Hemoglobin A1c  -     Lipid Panel  -     CBC Auto Differential  -     Vitamin B12  -     Vitamin D 25 Hydroxy  -     Iron level  -     Copper, Serum  -     Cancer Antigen 19-9  -     Zinc    Dyslipidemia  -     PSA Screen  -     Comprehensive Metabolic Panel  -     Hemoglobin A1c  -     Lipid Panel  -     CBC Auto Differential  -     Vitamin B12  -     Vitamin D 25 Hydroxy  -     Iron level  -     Copper, Serum  -     Cancer Antigen 19-9  -     Zinc    Hyperlipidemia, unspecified hyperlipidemia type  -     PSA Screen  -     Comprehensive Metabolic Panel  -     Hemoglobin A1c  -     Lipid Panel  -     CBC Auto Differential  -     Vitamin B12  -     Vitamin D 25 Hydroxy  -     Iron level  -     Copper, Serum  -     Cancer Antigen 19-9  -     Zinc    Screening for diabetes mellitus  -     PSA Screen  -      Comprehensive Metabolic Panel  -     Hemoglobin A1c  -     Lipid Panel  -     CBC Auto Differential  -     Vitamin B12  -     Vitamin D 25 Hydroxy  -     Iron level  -     Copper, Serum  -     Cancer Antigen 19-9  -     Zinc    Screening for prostate cancer  -     PSA Screen  -     Comprehensive Metabolic Panel  -     Hemoglobin A1c  -     Lipid Panel  -     CBC Auto Differential  -     Vitamin B12  -     Vitamin D 25 Hydroxy  -     Iron level  -     Copper, Serum  -     Cancer Antigen 19-9  -     Zinc      Will get labs as above.  Will evaluate for nutritional defects.  Prostate cancer screening with digital rectal exam was performed after blood work.  Encourage the patient to continue following up with specialist.  Encouraged patient to continue with proper nutrition.    Return for As needed.      Kip Torres MD     none